# Patient Record
Sex: FEMALE | Race: WHITE | NOT HISPANIC OR LATINO | URBAN - METROPOLITAN AREA
[De-identification: names, ages, dates, MRNs, and addresses within clinical notes are randomized per-mention and may not be internally consistent; named-entity substitution may affect disease eponyms.]

---

## 2021-06-02 ENCOUNTER — EMERGENCY (EMERGENCY)
Facility: HOSPITAL | Age: 81
LOS: 0 days | Discharge: HOME | End: 2021-06-02
Attending: EMERGENCY MEDICINE
Payer: MEDICARE

## 2021-06-02 VITALS
HEART RATE: 75 BPM | TEMPERATURE: 98 F | SYSTOLIC BLOOD PRESSURE: 132 MMHG | DIASTOLIC BLOOD PRESSURE: 56 MMHG | OXYGEN SATURATION: 95 % | RESPIRATION RATE: 17 BRPM

## 2021-06-02 VITALS — WEIGHT: 139.99 LBS | HEIGHT: 64 IN

## 2021-06-02 DIAGNOSIS — E78.5 HYPERLIPIDEMIA, UNSPECIFIED: ICD-10-CM

## 2021-06-02 DIAGNOSIS — F41.9 ANXIETY DISORDER, UNSPECIFIED: ICD-10-CM

## 2021-06-02 DIAGNOSIS — Z96.651 PRESENCE OF RIGHT ARTIFICIAL KNEE JOINT: ICD-10-CM
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
## 2021-06-02 DIAGNOSIS — S80.01XA CONTUSION OF RIGHT KNEE, INITIAL ENCOUNTER: ICD-10-CM

## 2021-06-02 DIAGNOSIS — S00.91XA ABRASION OF UNSPECIFIED PART OF HEAD, INITIAL ENCOUNTER: ICD-10-CM

## 2021-06-02 DIAGNOSIS — Z23 ENCOUNTER FOR IMMUNIZATION: ICD-10-CM

## 2021-06-02 DIAGNOSIS — M79.10 MYALGIA, UNSPECIFIED SITE: ICD-10-CM

## 2021-06-02 DIAGNOSIS — Z88.5 ALLERGY STATUS TO NARCOTIC AGENT: ICD-10-CM

## 2021-06-02 DIAGNOSIS — S09.90XA UNSPECIFIED INJURY OF HEAD, INITIAL ENCOUNTER: ICD-10-CM
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
## 2021-06-02 PROCEDURE — 73562 X-RAY EXAM OF KNEE 3: CPT | Mod: RT

## 2021-06-02 PROCEDURE — 73562 X-RAY EXAM OF KNEE 3: CPT | Mod: 26,RT

## 2021-06-02 PROCEDURE — 99283 EMERGENCY DEPT VISIT LOW MDM: CPT | Mod: 25

## 2021-06-02 PROCEDURE — 90471 IMMUNIZATION ADMIN: CPT

## 2021-06-02 PROCEDURE — 99283 EMERGENCY DEPT VISIT LOW MDM: CPT

## 2021-06-02 PROCEDURE — 90715 TDAP VACCINE 7 YRS/> IM: CPT

## 2021-06-02 RX ORDER — TETANUS TOXOID, REDUCED DIPHTHERIA TOXOID AND ACELLULAR PERTUSSIS VACCINE, ADSORBED 5; 2.5; 8; 8; 2.5 [IU]/.5ML; [IU]/.5ML; UG/.5ML; UG/.5ML; UG/.5ML
0.5 SUSPENSION INTRAMUSCULAR ONCE
Refills: 0 | Status: COMPLETED | OUTPATIENT
Start: 2021-06-02 | End: 2021-06-02

## 2021-06-02 RX ADMIN — TETANUS TOXOID, REDUCED DIPHTHERIA TOXOID AND ACELLULAR PERTUSSIS VACCINE, ADSORBED 0.5 MILLILITER(S): 5; 2.5; 8; 8; 2.5 SUSPENSION INTRAMUSCULAR at 22:41

## 2021-06-02 NOTE — ED PROVIDER NOTE - OBJECTIVE STATEMENT
82 y/o female in ED s/p trip and fall this afternoon at 1300.   pt states hit head and knee on floor.   pt denies any LOC, change in vision, neck pain, cp, sob, n/v/d/abd pain.    pt states went to UC and advised to come to ED.   states instead went to her PMD and had CT done with no bleed.   pt states then received call from  that she needed to be evaluated at [facility].

## 2021-06-02 NOTE — ED ADULT NURSE NOTE - NSIMPLEMENTINTERV_GEN_ALL_ED
Implemented All Fall Risk Interventions:  [place] to call system. Call bell, personal items and telephone within reach. Instruct patient to call for assistance. Room bathroom lighting operational. Non-slip footwear when patient is off stretcher. Physically safe environment: no spills, clutter or unnecessary equipment. Stretcher in lowest position, wheels locked, appropriate side rails in place. Provide visual cue, wrist band, yellow gown, etc. Monitor gait and stability. Monitor for mental status changes and reorient to person, place, and time. Review medications for side effects contributing to fall risk. Reinforce activity limits and safety measures with patient and family.

## 2021-06-02 NOTE — ED PROVIDER NOTE - PATIENT PORTAL LINK FT
You can access the FollowMyHealth Patient Portal offered by [facility] by registering at the following website: [email or web address]. By joining [employer]’s FollowMyHealth portal, you will also be able to view your health information using other applications (apps) compatible with our system.

## 2021-06-02 NOTE — ED ADULT NURSE NOTE - OBJECTIVE STATEMENT
Pt. is a 81YOF presents to ED with complaints of head injury s/p fall at approximately 1300 today. pt was seen at urgent care and was referred to [facility], but went to [facility]. pt received CT and MRI, but on follow up call from urgent care was told to still report to [facility] for further work up. pt denies blood thinners, but does endorse seizure history. Pt. has bruising and abrasion to left side of forehead. C/o knee pain

## 2021-06-02 NOTE — ED ADULT NURSE NOTE - CHIEF COMPLAINT QUOTE
pt presents to ED with complaints of head injury s/p fall at approximately 1300 today. pt was seen at urgent care and was referred to [facility], but went to [facility]. pt received CT and MRI, but on follow up call from urgent care was told to still report to [facility] for further work up. pt denies blood thinners, but does endorse seizure history.  MD Ayala called to triage for possible neuro alert. no neuro alert to be called, but pt to be sent to main for further evaluation.

## 2021-06-02 NOTE — ED ADULT TRIAGE NOTE - CHIEF COMPLAINT QUOTE
pt presents to ED with complaints of head injury s/p fall at approximately 1300 today. pt was seen at urgent care and was referred to [facility], but went to [facility]. pt received CT and MRI, but on follow up call from urgent care was told to still report to [facility] for further work up. pt denies blood thinners, but does endorse seizure history.  MD Ayala called to triage for possible neuro alert. no neuro alert to be called, but pt to be sent to main for further evaluation.

## 2021-06-02 NOTE — ED PROVIDER NOTE - NSFOLLOWUPINSTRUCTIONS_ED_ALL_ED_FT
please follow up with your doctor in 3-5 days.   keep wounds clean and dry.   return to ED for any concerns

## 2021-06-15 ENCOUNTER — EMERGENCY (EMERGENCY)
Facility: HOSPITAL | Age: 81
LOS: 0 days | Discharge: HOME | End: 2021-06-15
Attending: EMERGENCY MEDICINE
Payer: MEDICARE

## 2021-06-15 VITALS — HEIGHT: 64 IN | WEIGHT: 130.07 LBS

## 2021-06-15 VITALS
DIASTOLIC BLOOD PRESSURE: 67 MMHG | HEART RATE: 86 BPM | OXYGEN SATURATION: 96 % | TEMPERATURE: 98 F | SYSTOLIC BLOOD PRESSURE: 120 MMHG | RESPIRATION RATE: 18 BRPM

## 2021-06-15 DIAGNOSIS — E78.5 HYPERLIPIDEMIA, UNSPECIFIED: ICD-10-CM

## 2021-06-15 DIAGNOSIS — Z96.651 PRESENCE OF RIGHT ARTIFICIAL KNEE JOINT: ICD-10-CM

## 2021-06-15 DIAGNOSIS — S90.01XA CONTUSION OF RIGHT ANKLE, INITIAL ENCOUNTER: ICD-10-CM

## 2021-06-15 DIAGNOSIS — G40.909 EPILEPSY, UNSPECIFIED, NOT INTRACTABLE, WITHOUT STATUS EPILEPTICUS: ICD-10-CM

## 2021-06-15 DIAGNOSIS — F41.9 ANXIETY DISORDER, UNSPECIFIED: ICD-10-CM

## 2021-06-15 DIAGNOSIS — M25.561 PAIN IN RIGHT KNEE: ICD-10-CM

## 2021-06-15 PROCEDURE — 99284 EMERGENCY DEPT VISIT MOD MDM: CPT

## 2021-06-15 PROCEDURE — 93971 EXTREMITY STUDY: CPT | Mod: RT

## 2021-06-15 PROCEDURE — 93971 EXTREMITY STUDY: CPT | Mod: 26,RT

## 2021-06-15 PROCEDURE — 99284 EMERGENCY DEPT VISIT MOD MDM: CPT | Mod: 25

## 2021-06-15 NOTE — ED ADULT TRIAGE NOTE - CHIEF COMPLAINT QUOTE
patient states she fell about 2 weeks ago landing on her right knee.  patient returns today for worsening bruising to area.  patient denies worsening pain.

## 2021-06-15 NOTE — ED STATDOCS - ATTENDING CONTRIBUTION TO CARE
Dr. Quinonez: I performed a face to face bedside interview with patient regarding history of present illness, review of symptoms and past medical history. I completed an independent physical exam.  I have discussed patient's plan of care with PA.   I agree with note as stated above, having amended the EMR as needed to reflect my findings.   This includes HISTORY OF PRESENT ILLNESS, HIV, PAST MEDICAL/SURGICAL/FAMILY/SOCIAL HISTORY, ALLERGIES AND HOME MEDICATIONS, REVIEW OF SYSTEMS, PHYSICAL EXAM, and any PROGRESS NOTES during the time I functioned as the attending physician for this patient.

## 2021-06-15 NOTE — ED STATDOCS - OBJECTIVE STATEMENT
80 y/o F with PMHx of HLD, seizure disorder, anxiety, and s/p R TKR presents to the ED c/o increased +ecchymosis to R ankle s/p fall 2 weeks ago. Pt already had negative XR, now concerned for possible clot. No fever. PCP: Dr. Mattie Horton.

## 2021-06-15 NOTE — ED STATDOCS - SKIN, MLM
+well healed R midline knee scar with healing superficial laceration and purple blue ecchymosis from knee to foot

## 2021-06-15 NOTE — ED STATDOCS - PROGRESS NOTE DETAILS
Patient pending doppler to r/o DVT, case endorsed to CHRISTIAN Alves PA-C Neg. DVT.   Will AURELIA Gonazlez PA-C

## 2021-06-15 NOTE — ED STATDOCS - PATIENT PORTAL LINK FT
You can access the FollowMyHealth Patient Portal offered by  by registering at the following website: [email or web address]. By joining [employer]’s FollowMyHealth portal, you will also be able to view your health information using other applications (apps) compatible with our system.

## 2021-06-15 NOTE — ED STATDOCS - NSFOLLOWUPINSTRUCTIONS_ED_ALL_ED_FT
Knee Pain    AMBULATORY CARE:    Knee pain may start suddenly, or it may be a long-term problem. You may have pain on the side, front, or back of your knee. You may have knee stiffness and swelling. You may hear popping sounds or feel like your knee is giving way or locking up as you walk. You may feel pain when you sit, stand, walk, or climb up and down stairs. Knee pain can be caused by conditions such as obesity, inflammation, or strains or tears in ligaments or tendons.    Seek care immediately if:   •Your pain is worse, even after treatment.       •You cannot bend or straighten your leg completely.       •The swelling around your knee does not go down even with treatment.      •Your knee is painful and hot to the touch.       Contact your healthcare provider if:   •You have questions or concerns about your condition or care.           Treatment will depend on the cause of your pain. You may need any of the following:   •NSAIDs help decrease swelling and pain or fever. This medicine is available with or without a doctor's order. NSAIDs can cause stomach bleeding or kidney problems in certain people. If you take blood thinner medicine, always ask your healthcare provider if NSAIDs are safe for you. Always read the medicine label and follow directions.      •Acetaminophen decreases pain and fever. It is available without a doctor's order. Ask how much to take and how often to take it. Follow directions. Read the labels of all other medicines you are using to see if they also contain acetaminophen, or ask your doctor or pharmacist. Acetaminophen can cause liver damage if not taken correctly. Do not use more than 4 grams (4,000 milligrams) total of acetaminophen in one day.       •Prescription pain medicine may be given. Ask your healthcare provider how to take this medicine safely. Some prescription pain medicines contain acetaminophen. Do not take other medicines that contain acetaminophen without talking to your healthcare provider. Too much acetaminophen may cause liver damage. Prescription pain medicine may cause constipation. Ask your healthcare provider how to prevent or treat constipation.       •Steroid injections may be given into your knee. Steroids reduce inflammation and pain.      •Surgery may be used for some injuries, such as to repair a torn ACL.      What you can do to manage your symptoms:   •Rest your knee so it can heal. Limit activities that increase your pain. Do low-impact exercises, such as walking or swimming.       •Apply ice to help reduce swelling and pain. Use an ice pack, or put crushed ice in a plastic bag. Cover it with a towel before you apply it to your knee. Apply ice for 15 to 20 minutes every hour, or as directed.      •Apply compression to help reduce swelling. Use a brace or bandage only as directed.      •Elevate your knee to help decrease pain and swelling. Elevate your knee while you are sitting or lying down. Prop your leg on pillows to keep your knee above the level of your heart.      •Prevent your knee from moving as directed. Your healthcare provider may put on a cast or splint. You may need to wear a leg brace to stabilize your knee. A leg brace can be adjusted to increase your range of motion as your knee heals.  Hinged Knee Braces            What you can do to prevent knee pain:   •Maintain a healthy weight. Extra weight increases your risk for knee pain. Ask your healthcare provider how much you should weigh. He or she can help you create a safe weight loss plan if you need to lose weight.      •Exercise or train properly. Use the correct equipment for sports. Wear shoes that provide good support. Check your posture often as you exercise, play sports, or train for an event. This can help prevent stress and strain on your knees. Rest between sessions so you do not overwork your knees.      Follow up with your healthcare provider within 24 hours or as directed: Write down your questions so you remember to ask them during your visits.

## 2021-06-15 NOTE — ED STATDOCS - CARE PROVIDER_API CALL
Eldon Cabrera)  Orthopaedic Surgery  [address] [place] [address] [place] [address]  Phone: [phone]  Fax: [phone]  Follow Up Time:

## 2021-09-20 NOTE — ED STATDOCS - CPE ED MUSC NORM
Clinic Care Coordination Contact  [facility]: Post-Discharge Note  SITUATION                                                      Admission:    Admission Date: 09/18/21   Reason for Admission: Diarrhea  Discharge:   Discharge Date: 09/18/21  Discharge Diagnosis: Colitis , Rectal bleeding    BACKGROUND                                                         Rowan Leiva is a 61 year old female on Eliquis with recent history of pancolitis, DVT, cerebellar stroke, anemia, NSTEMI, CKD, and type 2 diabetes who presents for evaluation of diarrhea. Rowan was admitted to [facility] on 9/14 for pancolitis after one week of diarrhea with blood in the stool. She was taken off Eliquis. She left [place] on 9/16 because she did not feel there was a plan for her care and the bleeding had stopped. Since then she continues to have diarrhea and developed blood in the stool again last night. She has had 30 episode of diarrhea yesterday with about 7 episodes that were completely blood. She reports associated 8/10 abdominal pain just before a bowel movement.    ASSESSMENT      Enrollment  Primary Care Care Coordination Status: Declined    Discharge Assessment  How are you doing now that you are home?: Patient states that all diarrhea and rectal bleeding resolved yesterday 9/19/21 morning. Denies any compliants today. Reviewed medication list, AVS and recommendation to follow up with PCP and GI. Patient is not sure who to call to establish care with GI. Advised patient to follow up with PCP and they can make GI referral as they see fit. Patient verbalized understanding. Patient declines CC follow up outreaches as she has all the support and resources she needs at this time.  How are your symptoms? (Red Flag symptoms escalate to triage hotline per guidelines): Improved  Do you feel your condition is stable enough to be safe at home until your provider visit?: Yes  Does the patient have their discharge instructions? : Yes  Does  the patient have questions regarding their discharge instructions? : No  Were you started on any new medications or were there changes to any of your previous medications? : No  Does the patient have all of their medications?: Yes  Do you have questions regarding any of your medications? : Yes (see comment) (Patient is wondering if/when she should restart blood thinner med, advised to see PCP)  Do you have all of your needed medical supplies or equipment (DME)?  (i.e. oxygen tank, CPAP, cane, etc.): Yes  Discharge follow-up appointment scheduled within 14 calendar days? : No  Is patient agreeable to assistance with scheduling? : Yes              Care Management       Care Mgmt General Assessment  Referral  Referral Source: IP Handoff  Health Care Home/Utilization  Preferred Hospital: [facility]  [phone]  Living Situation  Current living arrangement:: Not Assessed  Type of residence:: Private home - stairs  Resources  Patient receiving home care services:: No  Community Resources: DME  Supplies used at home:: Diabetic Supplies  Equipment Currently Used at Home: walker, standard  Referrals Placed: None  Psychosocial  Pentecostal or spiritual beliefs that impact treatment:: No  Mental health DX:: No  Mental health management concern (GOAL):: No  Informal Support system:: Partner;Parent  Functional Status  Dependent ADLs:: Independent  Dependent IADLs:: Independent  Bed or wheelchair confined:: No  Mobility Status: Independent  Advance Care Plan/Directive  Advanced Care Plans/Directives on file:: No  Advanced Care Plan/Directive Status: Considering Options    PLAN                                                      Outpatient Plan:  Patient was provided with RN CC's contact information and encouraged to call with questions, concerns, support needs. RN CC will remain available as needed. No further care coordination outreaches will be made at this time.       Future Appointments   Date Time  Provider Department Center   9/21/2021  9:00 AM Emmie Bar, [facility]   10/5/2021  2:00 PM CS PULMONARY FUNCTION [facility]    10/5/2021  3:00 PM Karen Champagne MD [facility] CS   10/11/2021  1:30 PM  PIV LAB [facility]   10/11/2021  2:20 PM Lexa Miller MD [facility]         For any urgent concerns, please contact our 24 hour nurse triage line: [phone] ([phone])         Zakia Green RN                 normal...

## 2022-05-28 ENCOUNTER — INPATIENT (INPATIENT)
Facility: HOSPITAL | Age: 82
LOS: 5 days | Discharge: NURSING, REHAB OR LONG-TERM CARE | DRG: 101 | End: 2022-06-03
Attending: STUDENT IN AN ORGANIZED HEALTH CARE EDUCATION/TRAINING PROGRAM | Admitting: INTERNAL MEDICINE
Payer: MEDICARE

## 2022-05-28 VITALS
DIASTOLIC BLOOD PRESSURE: 72 MMHG | TEMPERATURE: 98 F | RESPIRATION RATE: 18 BRPM | SYSTOLIC BLOOD PRESSURE: 143 MMHG | HEART RATE: 74 BPM | HEIGHT: 64 IN | OXYGEN SATURATION: 96 % | WEIGHT: 156.97 LBS

## 2022-05-28 LAB
ALBUMIN SERPL ELPH-MCNC: 3.3 G/DL — SIGNIFICANT CHANGE UP (ref 3.3–5)
ALP SERPL-CCNC: 64 U/L — SIGNIFICANT CHANGE UP (ref 40–120)
ALT FLD-CCNC: 27 U/L — SIGNIFICANT CHANGE UP (ref 12–78)
ANION GAP SERPL CALC-SCNC: 3 MMOL/L — LOW (ref 5–17)
APTT BLD: 29.8 SEC — SIGNIFICANT CHANGE UP (ref 27.5–35.5)
AST SERPL-CCNC: 28 U/L — SIGNIFICANT CHANGE UP (ref 15–37)
BASOPHILS # BLD AUTO: 0.03 K/UL — SIGNIFICANT CHANGE UP (ref 0–0.2)
BASOPHILS NFR BLD AUTO: 0.5 % — SIGNIFICANT CHANGE UP (ref 0–2)
BILIRUB SERPL-MCNC: 0.4 MG/DL — SIGNIFICANT CHANGE UP (ref 0.2–1.2)
BUN SERPL-MCNC: 17 MG/DL — SIGNIFICANT CHANGE UP (ref 7–23)
CALCIUM SERPL-MCNC: 8.9 MG/DL — SIGNIFICANT CHANGE UP (ref 8.5–10.1)
CHLORIDE SERPL-SCNC: 106 MMOL/L — SIGNIFICANT CHANGE UP (ref 96–108)
CO2 SERPL-SCNC: 30 MMOL/L — SIGNIFICANT CHANGE UP (ref 22–31)
CREAT SERPL-MCNC: 0.83 MG/DL — SIGNIFICANT CHANGE UP (ref 0.5–1.3)
EGFR: 71 ML/MIN/1.73M2 — SIGNIFICANT CHANGE UP
EOSINOPHIL # BLD AUTO: 0.15 K/UL — SIGNIFICANT CHANGE UP (ref 0–0.5)
EOSINOPHIL NFR BLD AUTO: 2.6 % — SIGNIFICANT CHANGE UP (ref 0–6)
ETHANOL SERPL-MCNC: <10 MG/DL — SIGNIFICANT CHANGE UP (ref 0–10)
GLUCOSE SERPL-MCNC: 100 MG/DL — HIGH (ref 70–99)
HCT VFR BLD CALC: 38.8 % — SIGNIFICANT CHANGE UP (ref 34.5–45)
HGB BLD-MCNC: 13.1 G/DL — SIGNIFICANT CHANGE UP (ref 11.5–15.5)
IMM GRANULOCYTES NFR BLD AUTO: 0.3 % — SIGNIFICANT CHANGE UP (ref 0–1.5)
INR BLD: 1.16 RATIO — SIGNIFICANT CHANGE UP (ref 0.88–1.16)
LYMPHOCYTES # BLD AUTO: 1.31 K/UL — SIGNIFICANT CHANGE UP (ref 1–3.3)
LYMPHOCYTES # BLD AUTO: 22.5 % — SIGNIFICANT CHANGE UP (ref 13–44)
MCHC RBC-ENTMCNC: 31.9 PG — SIGNIFICANT CHANGE UP (ref 27–34)
MCHC RBC-ENTMCNC: 33.8 GM/DL — SIGNIFICANT CHANGE UP (ref 32–36)
MCV RBC AUTO: 94.4 FL — SIGNIFICANT CHANGE UP (ref 80–100)
MONOCYTES # BLD AUTO: 0.64 K/UL — SIGNIFICANT CHANGE UP (ref 0–0.9)
MONOCYTES NFR BLD AUTO: 11 % — SIGNIFICANT CHANGE UP (ref 2–14)
NEUTROPHILS # BLD AUTO: 3.68 K/UL — SIGNIFICANT CHANGE UP (ref 1.8–7.4)
NEUTROPHILS NFR BLD AUTO: 63.1 % — SIGNIFICANT CHANGE UP (ref 43–77)
PLATELET # BLD AUTO: 212 K/UL — SIGNIFICANT CHANGE UP (ref 150–400)
POTASSIUM SERPL-MCNC: 4.3 MMOL/L — SIGNIFICANT CHANGE UP (ref 3.5–5.3)
POTASSIUM SERPL-SCNC: 4.3 MMOL/L — SIGNIFICANT CHANGE UP (ref 3.5–5.3)
PROT SERPL-MCNC: 6.6 GM/DL — SIGNIFICANT CHANGE UP (ref 6–8.3)
PROTHROM AB SERPL-ACNC: 13.5 SEC — HIGH (ref 10.5–13.4)
RBC # BLD: 4.11 M/UL — SIGNIFICANT CHANGE UP (ref 3.8–5.2)
RBC # FLD: 12.7 % — SIGNIFICANT CHANGE UP (ref 10.3–14.5)
SODIUM SERPL-SCNC: 139 MMOL/L — SIGNIFICANT CHANGE UP (ref 135–145)
TROPONIN I, HIGH SENSITIVITY RESULT: 17.59 NG/L — SIGNIFICANT CHANGE UP
WBC # BLD: 5.83 K/UL — SIGNIFICANT CHANGE UP (ref 3.8–10.5)
WBC # FLD AUTO: 5.83 K/UL — SIGNIFICANT CHANGE UP (ref 3.8–10.5)

## 2022-05-28 PROCEDURE — 36415 COLL VENOUS BLD VENIPUNCTURE: CPT

## 2022-05-28 PROCEDURE — 80185 ASSAY OF PHENYTOIN TOTAL: CPT

## 2022-05-28 PROCEDURE — 95816 EEG AWAKE AND DROWSY: CPT

## 2022-05-28 PROCEDURE — 85027 COMPLETE CBC AUTOMATED: CPT

## 2022-05-28 PROCEDURE — 97116 GAIT TRAINING THERAPY: CPT | Mod: GP

## 2022-05-28 PROCEDURE — U0005: CPT

## 2022-05-28 PROCEDURE — A9579: CPT

## 2022-05-28 PROCEDURE — 70553 MRI BRAIN STEM W/O & W/DYE: CPT

## 2022-05-28 PROCEDURE — 92523 SPEECH SOUND LANG COMPREHEN: CPT | Mod: GN

## 2022-05-28 PROCEDURE — 92507 TX SP LANG VOICE COMM INDIV: CPT | Mod: GN

## 2022-05-28 PROCEDURE — 70450 CT HEAD/BRAIN W/O DYE: CPT | Mod: 26,MA

## 2022-05-28 PROCEDURE — 95700 EEG CONT REC W/VID EEG TECH: CPT

## 2022-05-28 PROCEDURE — 80177 DRUG SCRN QUAN LEVETIRACETAM: CPT

## 2022-05-28 PROCEDURE — 80053 COMPREHEN METABOLIC PANEL: CPT

## 2022-05-28 PROCEDURE — 93010 ELECTROCARDIOGRAM REPORT: CPT

## 2022-05-28 PROCEDURE — 99285 EMERGENCY DEPT VISIT HI MDM: CPT

## 2022-05-28 PROCEDURE — U0003: CPT

## 2022-05-28 PROCEDURE — 92526 ORAL FUNCTION THERAPY: CPT | Mod: GN

## 2022-05-28 PROCEDURE — 95714 VEEG EA 12-26 HR UNMNTR: CPT

## 2022-05-28 PROCEDURE — 71045 X-RAY EXAM CHEST 1 VIEW: CPT | Mod: 26

## 2022-05-28 PROCEDURE — 97162 PT EVAL MOD COMPLEX 30 MIN: CPT | Mod: GP

## 2022-05-28 PROCEDURE — 97530 THERAPEUTIC ACTIVITIES: CPT | Mod: GP

## 2022-05-28 PROCEDURE — C9254: CPT

## 2022-05-28 PROCEDURE — 92610 EVALUATE SWALLOWING FUNCTION: CPT | Mod: GN

## 2022-05-28 RX ORDER — LEVETIRACETAM 250 MG/1
1000 TABLET, FILM COATED ORAL ONCE
Refills: 0 | Status: COMPLETED | OUTPATIENT
Start: 2022-05-28 | End: 2022-05-28

## 2022-05-28 RX ORDER — ASPIRIN/CALCIUM CARB/MAGNESIUM 324 MG
300 TABLET ORAL ONCE
Refills: 0 | Status: COMPLETED | OUTPATIENT
Start: 2022-05-28 | End: 2022-05-28

## 2022-05-28 RX ORDER — SODIUM CHLORIDE 9 MG/ML
1000 INJECTION INTRAMUSCULAR; INTRAVENOUS; SUBCUTANEOUS
Refills: 0 | Status: DISCONTINUED | OUTPATIENT
Start: 2022-05-28 | End: 2022-06-01

## 2022-05-28 RX ADMIN — Medication 300 MILLIGRAM(S): at 23:09

## 2022-05-28 RX ADMIN — Medication 0.5 MILLIGRAM(S): at 23:10

## 2022-05-28 RX ADMIN — LEVETIRACETAM 400 MILLIGRAM(S): 250 TABLET, FILM COATED ORAL at 23:09

## 2022-05-28 NOTE — ED ADULT NURSE NOTE - NSFALLRSKHMRSKTYOTFT_ED_ALL_ED
October 5, 2020        Yasmany Acosta  [address] [facility] [address]    To Whom It May Concern:    This is to certify Yasmany Acosta was evaluated on 10/05/20 and is unable to return to work.    Yasmany Acosta should self-quarantine until at least 10/6/2020.  ?  CDC guidelines for return to work are as follows:  • At least 24 hours have passed since fever resolution without use of fever reducing medication and   • Symptoms have improved and  • At least 10 days have passed since symptoms first appeared     Many employers are asking that employees be seen and reexamined to return to work. We ask that you follow the CDC guidelines above and that you do not ask that your employees be seen back in the clinic setting for a Return to Work slip when off due to presumed Covid related symptoms.    The Coronavirus is a rapidly evolving situation and recommendations are changing regularly to prevent spread of the disease and further loss of life.    Thank you for your understanding during these unusual times.          Mike Short MD                 [address] [facility] [address]     code stroke

## 2022-05-28 NOTE — ED PROVIDER NOTE - NEUROLOGICAL, MLM
expressive aphasia, no other neurological issues expressive aphasia, 5/5 strength bilateral upper and lower extremities, no sensory deficits, no facial droop, no dysmetria, normal vision, NIHSS 1 for aphasia

## 2022-05-28 NOTE — ED ADULT NURSE NOTE - CAS DISCH BELONGINGS RETURNED
01/11/22 0650   Sleep Analysis   Sleep Report Good   Sleep/Wake Cycle Unremarkable   Hours Slept 8   What Shift Do You Work? Does not apply   Have you been diagnosed with Sleep Apnea? No      Not applicable

## 2022-05-28 NOTE — ED PROVIDER NOTE - PROGRESS NOTE DETAILS
Sammie Alvarado for attending Dr. Dickinson: spoke with souse again pt last known normal was 9pm last night woke up at 7 am with symptoms. Sammie Alvarado for attending Dr. Dickinson: discussed with Dr. Rendon states pt can receive additional ativat, IV, keppra and can get EEG in the morning. s/p ativan, keppra, speech much more fluid, but she is sleepy 2/2 ativan.  Discussed with Dr. Burton for admit. Olegario Dickinson D.O.

## 2022-05-28 NOTE — ED ADULT TRIAGE NOTE - CHIEF COMPLAINT QUOTE
pt presents to the ED with AMS. as per  pt has been altered from baseline since 1900. pt with hx of seizures. Dr. Dickinson evaluated pt at triage, code stroke called at 2126.

## 2022-05-28 NOTE — ED ADULT NURSE NOTE - OBJECTIVE STATEMENT
Pt brought to ED by ambulance with complaints of altered mental status. Pt's  at bedside. 3 Pt brought to ED by ambulance with complaints of altered mental status. Code stroke called by MD Dickinson in EMS triage. Pt's  and daughter at bedside. As per Pt's , last known well was at 2100 yesterday, 5/27/22, before Pt went to bed. Pt's  states that Pt woke up altered this morning. Pt's  administered 1000 mg kepra and 1mg ativan as per Pt's normal routine. Pt slept for a few hours and awoke still altered. Pt's  administered evening dose of 1000 mg kepra as well. Per Pt's , Pt has history of seizures but has never had a stroke. Pt is ambulatory at  home. A&O x1 at this time; per Pt's family, Pt is normally alert and oriented. Pt does not follow directions or appropriately responds to certain questioning at this time. Airway is patent, breathing is even and unlabored. No evidence of shortness of breath or difficult breathing. Skin is warm and dry. PMS x4 extremities. Non-verbal indicators of pain absent. As per Pt's , Pt has history of right sided neck pain that has been ongoing for past 15 months without relief, Pt was scheduled to see neurologist on Tuesday, 5/31/22. 20G IV placed to right AC by EMS triage, flushes without difficulty. 22G IV placed by RN to left wrist, flushes without difficulty. Blood sent to lab for analysis. Pt placed on cardiac monitor, EKG completed. No additional requests or complaints at this time. Will continue to monitor.

## 2022-05-28 NOTE — PROVIDER CONTACT NOTE (OTHER) - SITUATION
paged @[phone] Dr. Dickinson spoke to Dr. Delarosa (oncall neurology)  paged @[phone], returned page @[phone]

## 2022-05-28 NOTE — ED PROVIDER NOTE - OBJECTIVE STATEMENT
81 y/o female with a PMHx of seizures on keppra, anxiety on ativan, and HLD presents to the ED BIB EMS s/p change in speech.  at bedside states pt began having difficulty speaking at 7 am, but states it was similar to prior seizures. Pt took an ativan, symptoms improved and she went to sleep. However, after waking up at 3 pm her symptoms were reportedly worse.  also states pt has had chronic neck pain s/p mechanical fall last year. pt has been scanned for seizures with no remarkable findings.  reports that pt does not shake during seizures and usually presents has changes in speech. Neuro: Dr. Pendleton. Stroke alert activated. 81 y/o female with a PMHx of seizures on keppra, anxiety on ativan, and HLD presents to the ED BIB EMS s/p change in speech.  at bedside states pt began having difficulty speaking at 7 am, but states it was similar to prior seizures.  gave patient 1 mg ativan PO, and patient slept after this.  However, after waking up at 3-330 pm her symptoms were still present, and according to  reportedly worse. He gave her other seizure medications today as well, Keppra 1000 mg AM and PM doses, without improvement in her symptoms.   also states pt has had chronic neck pain s/p mechanical fall last year. pt has been scanned for seizures with no remarkable findings.  reports that pt does not shake during seizures and usually presents has changes in speech. Neuro: Dr. Pendleton. Stroke alert activated.

## 2022-05-28 NOTE — ED ADULT NURSE NOTE - NSIMPLEMENTINTERV_GEN_ALL_ED
Implemented All Fall with Harm Risk Interventions:  [place] to call system. Call bell, personal items and telephone within reach. Instruct patient to call for assistance. Room bathroom lighting operational. Non-slip footwear when patient is off stretcher. Physically safe environment: no spills, clutter or unnecessary equipment. Stretcher in lowest position, wheels locked, appropriate side rails in place. Provide visual cue, wrist band, yellow gown, etc. Monitor gait and stability. Monitor for mental status changes and reorient to person, place, and time. Review medications for side effects contributing to fall risk. Reinforce activity limits and safety measures with patient and family. Provide visual clues: red socks.

## 2022-05-28 NOTE — ED PROVIDER NOTE - CLINICAL SUMMARY MEDICAL DECISION MAKING FREE TEXT BOX
Patient presented > 24 hours from last known normal, not tPA or interventional candidate.  Code stroke called to rule out bleed, which was negative.  Labs WNL.  Discussed with Dr. Delarosa, patient received additional dose of keppra and ativan, with apparent improvement in fluidity of speech.  Given ASA to cover for possible TIA/CVA.  More likely seizure given history of similar presentation in the past.  Plan for admit, neurology consult in AM, EEG in AM, MRI.  Olegario Dickinson D.O.

## 2022-05-29 DIAGNOSIS — R47.01 APHASIA: ICD-10-CM

## 2022-05-29 LAB
ALBUMIN SERPL ELPH-MCNC: 3.6 G/DL — SIGNIFICANT CHANGE UP (ref 3.3–5)
ALP SERPL-CCNC: 62 U/L — SIGNIFICANT CHANGE UP (ref 40–120)
ALT FLD-CCNC: 24 U/L — SIGNIFICANT CHANGE UP (ref 12–78)
ANION GAP SERPL CALC-SCNC: 10 MMOL/L — SIGNIFICANT CHANGE UP (ref 5–17)
APPEARANCE UR: CLEAR — SIGNIFICANT CHANGE UP
AST SERPL-CCNC: 23 U/L — SIGNIFICANT CHANGE UP (ref 15–37)
BILIRUB SERPL-MCNC: 0.4 MG/DL — SIGNIFICANT CHANGE UP (ref 0.2–1.2)
BILIRUB UR-MCNC: NEGATIVE — SIGNIFICANT CHANGE UP
BUN SERPL-MCNC: 17 MG/DL — SIGNIFICANT CHANGE UP (ref 7–23)
CALCIUM SERPL-MCNC: 9.2 MG/DL — SIGNIFICANT CHANGE UP (ref 8.5–10.1)
CHLORIDE SERPL-SCNC: 107 MMOL/L — SIGNIFICANT CHANGE UP (ref 96–108)
CO2 SERPL-SCNC: 22 MMOL/L — SIGNIFICANT CHANGE UP (ref 22–31)
COLOR SPEC: YELLOW — SIGNIFICANT CHANGE UP
CREAT SERPL-MCNC: 0.89 MG/DL — SIGNIFICANT CHANGE UP (ref 0.5–1.3)
DIFF PNL FLD: NEGATIVE — SIGNIFICANT CHANGE UP
EGFR: 66 ML/MIN/1.73M2 — SIGNIFICANT CHANGE UP
GLUCOSE SERPL-MCNC: 112 MG/DL — HIGH (ref 70–99)
GLUCOSE UR QL: NEGATIVE — SIGNIFICANT CHANGE UP
HCT VFR BLD CALC: 40 % — SIGNIFICANT CHANGE UP (ref 34.5–45)
HGB BLD-MCNC: 13.6 G/DL — SIGNIFICANT CHANGE UP (ref 11.5–15.5)
KETONES UR-MCNC: NEGATIVE — SIGNIFICANT CHANGE UP
LEUKOCYTE ESTERASE UR-ACNC: ABNORMAL
MCHC RBC-ENTMCNC: 32.1 PG — SIGNIFICANT CHANGE UP (ref 27–34)
MCHC RBC-ENTMCNC: 34 GM/DL — SIGNIFICANT CHANGE UP (ref 32–36)
MCV RBC AUTO: 94.3 FL — SIGNIFICANT CHANGE UP (ref 80–100)
NITRITE UR-MCNC: NEGATIVE — SIGNIFICANT CHANGE UP
PH UR: 7 — SIGNIFICANT CHANGE UP (ref 5–8)
PLATELET # BLD AUTO: 192 K/UL — SIGNIFICANT CHANGE UP (ref 150–400)
POTASSIUM SERPL-MCNC: 3.6 MMOL/L — SIGNIFICANT CHANGE UP (ref 3.5–5.3)
POTASSIUM SERPL-SCNC: 3.6 MMOL/L — SIGNIFICANT CHANGE UP (ref 3.5–5.3)
PROT SERPL-MCNC: 6.8 GM/DL — SIGNIFICANT CHANGE UP (ref 6–8.3)
PROT UR-MCNC: NEGATIVE — SIGNIFICANT CHANGE UP
RBC # BLD: 4.24 M/UL — SIGNIFICANT CHANGE UP (ref 3.8–5.2)
RBC # FLD: 12.6 % — SIGNIFICANT CHANGE UP (ref 10.3–14.5)
SARS-COV-2 RNA SPEC QL NAA+PROBE: SIGNIFICANT CHANGE UP
SODIUM SERPL-SCNC: 139 MMOL/L — SIGNIFICANT CHANGE UP (ref 135–145)
SP GR SPEC: 1.01 — SIGNIFICANT CHANGE UP (ref 1.01–1.02)
UROBILINOGEN FLD QL: NEGATIVE — SIGNIFICANT CHANGE UP
WBC # BLD: 6.21 K/UL — SIGNIFICANT CHANGE UP (ref 3.8–10.5)
WBC # FLD AUTO: 6.21 K/UL — SIGNIFICANT CHANGE UP (ref 3.8–10.5)

## 2022-05-29 PROCEDURE — 99222 1ST HOSP IP/OBS MODERATE 55: CPT

## 2022-05-29 PROCEDURE — 95816 EEG AWAKE AND DROWSY: CPT | Mod: 26

## 2022-05-29 PROCEDURE — 99223 1ST HOSP IP/OBS HIGH 75: CPT

## 2022-05-29 PROCEDURE — 12345: CPT | Mod: NC

## 2022-05-29 RX ORDER — LEVETIRACETAM 250 MG/1
1000 TABLET, FILM COATED ORAL EVERY 12 HOURS
Refills: 0 | Status: DISCONTINUED | OUTPATIENT
Start: 2022-05-29 | End: 2022-05-29

## 2022-05-29 RX ORDER — LEVETIRACETAM 250 MG/1
1500 TABLET, FILM COATED ORAL EVERY 12 HOURS
Refills: 0 | Status: ACTIVE | OUTPATIENT
Start: 2022-05-29 | End: 2023-04-27

## 2022-05-29 RX ORDER — ACETAMINOPHEN 500 MG
650 TABLET ORAL EVERY 6 HOURS
Refills: 0 | Status: DISCONTINUED | OUTPATIENT
Start: 2022-05-29 | End: 2022-06-03

## 2022-05-29 RX ORDER — LACOSAMIDE 50 MG/1
100 TABLET ORAL EVERY 12 HOURS
Refills: 0 | Status: DISCONTINUED | OUTPATIENT
Start: 2022-05-29 | End: 2022-05-30

## 2022-05-29 RX ORDER — LANOLIN ALCOHOL/MO/W.PET/CERES
3 CREAM (GRAM) TOPICAL AT BEDTIME
Refills: 0 | Status: DISCONTINUED | OUTPATIENT
Start: 2022-05-29 | End: 2022-06-03

## 2022-05-29 RX ORDER — ONDANSETRON 8 MG/1
4 TABLET, FILM COATED ORAL EVERY 8 HOURS
Refills: 0 | Status: DISCONTINUED | OUTPATIENT
Start: 2022-05-29 | End: 2022-06-03

## 2022-05-29 RX ORDER — LACOSAMIDE 50 MG/1
200 TABLET ORAL ONCE
Refills: 0 | Status: DISCONTINUED | OUTPATIENT
Start: 2022-05-29 | End: 2022-05-29

## 2022-05-29 RX ORDER — LEVETIRACETAM 250 MG/1
1000 TABLET, FILM COATED ORAL
Refills: 0 | Status: DISCONTINUED | OUTPATIENT
Start: 2022-05-29 | End: 2022-05-29

## 2022-05-29 RX ADMIN — LEVETIRACETAM 400 MILLIGRAM(S): 250 TABLET, FILM COATED ORAL at 22:19

## 2022-05-29 RX ADMIN — Medication 250 MILLIGRAM(S): at 22:18

## 2022-05-29 RX ADMIN — LACOSAMIDE 120 MILLIGRAM(S): 50 TABLET ORAL at 22:59

## 2022-05-29 RX ADMIN — LACOSAMIDE 200 MILLIGRAM(S): 50 TABLET ORAL at 11:11

## 2022-05-29 RX ADMIN — Medication 0.5 MILLIGRAM(S): at 11:09

## 2022-05-29 RX ADMIN — LEVETIRACETAM 400 MILLIGRAM(S): 250 TABLET, FILM COATED ORAL at 11:11

## 2022-05-29 RX ADMIN — SODIUM CHLORIDE 100 MILLILITER(S): 9 INJECTION INTRAMUSCULAR; INTRAVENOUS; SUBCUTANEOUS at 01:36

## 2022-05-29 NOTE — SWALLOW BEDSIDE ASSESSMENT ADULT - SWALLOW EVAL: DIAGNOSIS
1) Pt exhibits reduced orientation to feeding(i.e inattention, distractibility) atop Oropharyngeal Swallowing abilities which subjectively appeared to be grossly functional for age when she is in an alert/calm state. NO behavioral aspiration signs exhibited. Odynophagia denied.  2) The pt is awake. She is inattentive and internally distractible. Unable to direct to structured communication tasks. Pt followed a limited # of 1 step verbal commands & occasionally verbalized during communicative probes. At these times, her motor speech abilities were functional. 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 Above in setting of suspected seizure activity.

## 2022-05-29 NOTE — H&P ADULT - ASSESSMENT
A/P:    1.  Aphasia  Seizure Disorder  -follow clinically with Neuro checks  -keep on Seizure precaution  -on Keppra  -follow Neurology consult  -fall precaution    2.  SCD for DVT ppx     3.  Code status: Patient is in full code status.

## 2022-05-29 NOTE — SWALLOW BEDSIDE ASSESSMENT ADULT - COMMENTS
Pt admitted to  with onset of seizure activity with concomitant altered mentation/verbal expression difficulties. The patient does have an underlying h/o a seizure disorder with seizure activity manifesting in Aphasia. Other selected prior medical history is remarkable for anxiety, HLD, and prior right TKR. The patient was admitted to  with onset of seizure activity with concomitant altered mentation/verbal expression difficulties. The patient does have an underlying h/o a seizure disorder with seizure activity manifesting in Aphasia. Other selected prior medical history is remarkable for anxiety, HLD, and prior right TKR.

## 2022-05-29 NOTE — H&P ADULT - NSHPPHYSICALEXAM_GEN_ALL_CORE
Vital Signs Last 24 Hrs  T(C): 36.4 (29 May 2022 03:30), Max: 37.1 (29 May 2022 00:41)  T(F): 97.5 (29 May 2022 03:30), Max: 98.7 (29 May 2022 00:41)  HR: 64 (29 May 2022 03:30) (64 - 80)  BP: 155/83 (29 May 2022 03:30) (121/57 - 155/83)  BP(mean): 105 (29 May 2022 03:30) (77 - 124)  RR: 18 (29 May 2022 03:30) (15 - 22)  SpO2: 99% (29 May 2022 03:30) (92% - 99%)

## 2022-05-29 NOTE — CONSULT NOTE ADULT - ASSESSMENT
82 year old woman hx of seizures, on Keppra 1000 mg in AM and 1500 mg in evening; presents with change in  mental state, similar events in the past usually brake with Ativan. Presently  exam limited due to mental state, grossly non focal, Ct head shows no acute changes. EEG c/w status epilepticus, non convulsive.  Suggests:  -change to IV Keppra, 1500 mg q 12  -add Vimpat 200 mg IV x 1, then 100 mg q 12  -Ativan 1 mg x 1 IVP  -EEG monitor

## 2022-05-29 NOTE — CONSULT NOTE ADULT - SUBJECTIVE AND OBJECTIVE BOX
Neurology Consult requested by:   Patient is a 82y old  Female who presents with a chief complaint of Aphasia and Seizure (29 May 2022 04:01)     HPI:  81 y/o woman with a PMHx of seizures presented to the ED BIB EMS after change in speech.  stated patient began having difficulty speaking yesterday at 7 am, but stated it was similar to prior seizures.  gave patient 1 mg ativan PO, and patient slept after this. However, after waking up at 3-330 pm yesterday her symptoms were still present, and according to  reportedly worse. He gave her other seizure medications today as well, Keppra 1000 mg AM and PM doses, without improvement in her symptoms.   also stated patient had chronic shoulder pain s/p mechanical fall last year.  reported that pt does not shake during seizures and usually presents with changes in speech.     Family Hx:  Unable to get detail family history as patient is aphasic and confused.  (29 May 2022 04:01)      PAST MEDICAL & SURGICAL HISTORY:  Seizure disorder      Hyperlipidemia      Anxiety disorder      Total knee replacement status  right        FAMILY HISTORY:    Social Hx:  Nonsmoker, no drug or alcohol use  Medications and Allergies ReviewedMEDICATIONS  (STANDING):  lacosamide Injectable 200 milliGRAM(s) IV Push once  lacosamide IVPB 100 milliGRAM(s) IV Intermittent every 12 hours  levETIRAcetam  IVPB 1500 milliGRAM(s) IV Intermittent every 12 hours  LORazepam   Injectable 1 milliGRAM(s) IV Push once  LORazepam   Injectable 1 milliGRAM(s) IV Push once  multivitamin 1 Tablet(s) Oral daily  naproxen 250 milliGRAM(s) Oral two times a day  sodium chloride 0.9%. 1000 milliLiter(s) (100 mL/Hr) IV Continuous <Continuous>     ROS: Pertinent positives in HPI, all other ROS were reviewed and are negative.      Examination:   Vital Signs Last 24 Hrs  T(C): 36.4 (29 May 2022 03:30), Max: 37.1 (29 May 2022 00:41)  T(F): 97.5 (29 May 2022 03:30), Max: 98.7 (29 May 2022 00:41)  HR: 66 (29 May 2022 08:48) (64 - 80)  BP: 146/63 (29 May 2022 08:48) (121/57 - 155/83)  BP(mean): 105 (29 May 2022 03:30) (77 - 124)  RR: 18 (29 May 2022 08:48) (15 - 22)  SpO2: 97% (29 May 2022 08:48) (92% - 99%)  General: Not cooperative, NAD   NECK: supple, no masses  Vascular : no carotid bruits,   Lungs: CTAB  Chest: RRR, no murmurs  Extremities: nontender, no edema  Musculoskeletal: no adventitious movements, no joint stiffness  Skin: no rash    Neurological Examination:  MS: Alert, non verbal, doesnt follow commands   CN: Blinks to threat, PERLL, EOMI, face symmetric, tongue midline, SCM equal bilaterally  Motor: normal bulk and tone, no observable tremor, briefly withdraws to pain bilat   Sens: as above.    Reflexes: 2/4 all over, downgoing toes b/l  Coord:  Unable to test   Gait: Cannot test    Comprehensive Metabolic Panel (05.28.22 @ 21:20)   Sodium, Serum: 139 mmol/L   Potassium, Serum: 4.3 mmol/L   Chloride, Serum: 106 mmol/L   Carbon Dioxide, Serum: 30 mmol/L   Anion Gap, Serum: 3 mmol/L   Blood Urea Nitrogen, Serum: 17 mg/dL   Creatinine, Serum: 0.83 mg/dL   Glucose, Serum: 100 mg/dL   Calcium, Total Serum: 8.9 mg/dL   Protein Total, Serum: 6.6 gm/dL   Albumin, Serum: 3.3 g/dL   Bilirubin Total, Serum: 0.4 mg/dL   Alkaline Phosphatase, Serum: 64 U/L   Aspartate Aminotransferase (AST/SGOT): 28 U/L   Alanine Aminotransferase (ALT/SGPT): 27 U/L   eGFR: 71:    Complete Blood Count + Automated Diff (05.28.22 @ 21:20)   WBC Count: 5.83 K/uL   RBC Count: 4.11 M/uL   Hemoglobin: 13.1 g/dL   Hematocrit: 38.8 %   Mean Cell Volume: 94.4 fl   Mean Cell Hemoglobin: 31.9 pg   Mean Cell Hemoglobin Conc: 33.8 gm/dL   Red Cell Distrib Width: 12.7 %   Platelet Count - Automated: 212 K/uL         < from: CT Brain Stroke Protocol (05.28.22 @ 21:42) >  PROCEDURE DATE:  05/28/2022          INTERPRETATION:  HISTORY: Stroke code.    COMPARISON: CT head 8/13/2013    TECHNIQUE: Axial noncontrast CT images from the skull base to the  vertex were obtained and submitted for interpretation. Coronal and sagittal   reformatted images were performed. Bone and soft tissue windows were   evaluated.    FINDINGS:    There is no acute intracranial mass-effect, hemorrhage, midline shift, or   abnormal extra-axial fluid collection. Gray-white differentiation is   maintained.    Mild chronic microvascular ischemic changes and chronic lacunar   infarction in the left corona radiata.    Mild generalized cerebral and cerebellar volume loss is present. No   hydrocephalus. Basal cisterns are patent.    Visualized paranasal sinuses and mastoid air cells are clear. Calvarium   is intact. 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    IMPRESSION:    No acute intracranial bleeding.  Mild chronic microvascular ischemic changes.    < end of copied text >      EEG: generalized spike and poly spike and wave discharges

## 2022-05-29 NOTE — SWALLOW BEDSIDE ASSESSMENT ADULT - SWALLOW EVAL: RECOMMENDED DIET
SUGGEST A REGULAR TEXTURE DIET WITH THIN LIQUIDS AS PATIENT TOLERATES THESE FOOD CONSISTENCIES FROM AN OROPHARYNGEAL SWALLOWING STANCE ON CLINICAL EXAM WHEN IN AN ALERT/CALM STATE. ASSISTANCE IS NEEDED TO FEED AND PT MUST BE FED ONLY WHEN IN AN ALERT/CALM STATE WHICH IS NOT ALWAYS THE CASE.

## 2022-05-29 NOTE — PROVIDER CONTACT NOTE (OTHER) - SITUATION
seizures    left message with ans service for dr to see pt in the morning [FreeTextEntry1] : Mrs. Daly is a delightful 69-year-old white female with a past medical history significant for hyperlipidemia, palpitations, and intermittent bundle branch block on EKG, who presents for follow up evaluation.  \par \par

## 2022-05-29 NOTE — SWALLOW BEDSIDE ASSESSMENT ADULT - SLP GENERAL OBSERVATIONS
The pt is awake. She is inattentive and internally distractible. Unable to direct to structured communication tasks. Pt followed a limited # of 1 step verbal commands & occasionally verbalized during communicative probes. At these times, her motor speech abilities were functional. 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 Above in setting of suspected seizure activity.

## 2022-05-29 NOTE — PATIENT PROFILE ADULT - FALL HARM RISK - HARM RISK INTERVENTIONS
Assistance with ambulation/Assistance OOB with selected safe patient handling equipment/Communicate Risk of Fall with Harm to all staff/Discuss with provider need for PT consult/Monitor gait and stability/Reinforce activity limits and safety measures with patient and family/Tailored Fall Risk Interventions/Visual Cue: Yellow wristband and red socks/Bed in lowest position, wheels locked, appropriate side rails in place/Call bell, personal items and telephone in reach/Instruct patient to call for assistance before getting out of bed or chair/Non-slip footwear when patient is out of bed/[place] to call system/Physically safe environment - no spills, clutter or unnecessary equipment/Purposeful Proactive Rounding/Room/bathroom lighting operational, light cord in reach

## 2022-05-29 NOTE — H&P ADULT - HISTORY OF PRESENT ILLNESS
83 y/o Female with a PMHx of seizures presented to the ED BIB EMS after change in speech.  stated patient began having difficulty speaking yesterday at 7 am, but stated it was similar to prior seizures.  gave patient 1 mg ativan PO, and patient slept after this. However, after waking up at 3-330 pm yesterday her symptoms were still present, and according to  reportedly worse. He gave her other seizure medications today as well, Keppra 1000 mg AM and PM doses, without improvement in her symptoms.   also stated patient had chronic shoulder pain s/p mechanical fall last year.  reported that pt does not shake during seizures and usually presents with changes in speech.     Family Hx:  Unable to get detail family history as patient is aphasic and confused.

## 2022-05-30 PROCEDURE — 95720 EEG PHY/QHP EA INCR W/VEEG: CPT

## 2022-05-30 PROCEDURE — 99232 SBSQ HOSP IP/OBS MODERATE 35: CPT

## 2022-05-30 RX ORDER — FOSPHENYTOIN 50 MG/ML
100 INJECTION INTRAMUSCULAR; INTRAVENOUS EVERY 8 HOURS
Refills: 0 | Status: ACTIVE | OUTPATIENT
Start: 2022-05-30 | End: 2023-04-28

## 2022-05-30 RX ORDER — LACOSAMIDE 50 MG/1
200 TABLET ORAL EVERY 12 HOURS
Refills: 0 | Status: ACTIVE | OUTPATIENT
Start: 2022-05-30 | End: 2022-06-06

## 2022-05-30 RX ORDER — FOSPHENYTOIN 50 MG/ML
1500 INJECTION INTRAMUSCULAR; INTRAVENOUS ONCE
Refills: 0 | Status: COMPLETED | OUTPATIENT
Start: 2022-05-30 | End: 2022-05-30

## 2022-05-30 RX ADMIN — Medication 1 TABLET(S): at 10:00

## 2022-05-30 RX ADMIN — LEVETIRACETAM 400 MILLIGRAM(S): 250 TABLET, FILM COATED ORAL at 09:54

## 2022-05-30 RX ADMIN — LACOSAMIDE 140 MILLIGRAM(S): 50 TABLET ORAL at 21:59

## 2022-05-30 RX ADMIN — FOSPHENYTOIN 60 MILLIGRAM(S) PE: 50 INJECTION INTRAMUSCULAR; INTRAVENOUS at 11:40

## 2022-05-30 RX ADMIN — Medication 250 MILLIGRAM(S): at 10:30

## 2022-05-30 RX ADMIN — SODIUM CHLORIDE 100 MILLILITER(S): 9 INJECTION INTRAMUSCULAR; INTRAVENOUS; SUBCUTANEOUS at 22:00

## 2022-05-30 RX ADMIN — Medication 250 MILLIGRAM(S): at 10:00

## 2022-05-30 RX ADMIN — LEVETIRACETAM 400 MILLIGRAM(S): 250 TABLET, FILM COATED ORAL at 22:36

## 2022-05-30 RX ADMIN — LACOSAMIDE 140 MILLIGRAM(S): 50 TABLET ORAL at 10:30

## 2022-05-30 RX ADMIN — SODIUM CHLORIDE 100 MILLILITER(S): 9 INJECTION INTRAMUSCULAR; INTRAVENOUS; SUBCUTANEOUS at 09:25

## 2022-05-30 RX ADMIN — Medication 250 MILLIGRAM(S): at 21:02

## 2022-05-30 RX ADMIN — FOSPHENYTOIN 104 MILLIGRAM(S) PE: 50 INJECTION INTRAMUSCULAR; INTRAVENOUS at 21:02

## 2022-05-31 LAB — PHENYTOIN FREE SERPL-MCNC: 20.9 UG/ML — HIGH (ref 10–20)

## 2022-05-31 PROCEDURE — 99232 SBSQ HOSP IP/OBS MODERATE 35: CPT

## 2022-05-31 PROCEDURE — 95720 EEG PHY/QHP EA INCR W/VEEG: CPT

## 2022-05-31 PROCEDURE — 99497 ADVNCD CARE PLAN 30 MIN: CPT

## 2022-05-31 PROCEDURE — 70553 MRI BRAIN STEM W/O & W/DYE: CPT | Mod: 26

## 2022-05-31 RX ADMIN — LEVETIRACETAM 400 MILLIGRAM(S): 250 TABLET, FILM COATED ORAL at 09:18

## 2022-05-31 RX ADMIN — FOSPHENYTOIN 104 MILLIGRAM(S) PE: 50 INJECTION INTRAMUSCULAR; INTRAVENOUS at 20:38

## 2022-05-31 RX ADMIN — ONDANSETRON 4 MILLIGRAM(S): 8 TABLET, FILM COATED ORAL at 23:01

## 2022-05-31 RX ADMIN — LACOSAMIDE 140 MILLIGRAM(S): 50 TABLET ORAL at 21:42

## 2022-05-31 RX ADMIN — SODIUM CHLORIDE 100 MILLILITER(S): 9 INJECTION INTRAMUSCULAR; INTRAVENOUS; SUBCUTANEOUS at 20:38

## 2022-05-31 RX ADMIN — FOSPHENYTOIN 104 MILLIGRAM(S) PE: 50 INJECTION INTRAMUSCULAR; INTRAVENOUS at 13:29

## 2022-05-31 RX ADMIN — Medication 250 MILLIGRAM(S): at 09:23

## 2022-05-31 RX ADMIN — FOSPHENYTOIN 104 MILLIGRAM(S) PE: 50 INJECTION INTRAMUSCULAR; INTRAVENOUS at 05:42

## 2022-05-31 RX ADMIN — SODIUM CHLORIDE 100 MILLILITER(S): 9 INJECTION INTRAMUSCULAR; INTRAVENOUS; SUBCUTANEOUS at 09:18

## 2022-05-31 RX ADMIN — LACOSAMIDE 140 MILLIGRAM(S): 50 TABLET ORAL at 10:12

## 2022-05-31 RX ADMIN — Medication 1 TABLET(S): at 09:23

## 2022-05-31 RX ADMIN — LEVETIRACETAM 400 MILLIGRAM(S): 250 TABLET, FILM COATED ORAL at 21:11

## 2022-05-31 RX ADMIN — Medication 250 MILLIGRAM(S): at 21:11

## 2022-06-01 LAB — PHENYTOIN FREE SERPL-MCNC: 29.8 UG/ML — HIGH (ref 10–20)

## 2022-06-01 PROCEDURE — 99232 SBSQ HOSP IP/OBS MODERATE 35: CPT

## 2022-06-01 PROCEDURE — 95816 EEG AWAKE AND DROWSY: CPT | Mod: 26

## 2022-06-01 RX ORDER — LACOSAMIDE 50 MG/1
100 TABLET ORAL
Refills: 0 | Status: DISCONTINUED | OUTPATIENT
Start: 2022-06-01 | End: 2022-06-03

## 2022-06-01 RX ORDER — LEVETIRACETAM 250 MG/1
1500 TABLET, FILM COATED ORAL
Refills: 0 | Status: DISCONTINUED | OUTPATIENT
Start: 2022-06-01 | End: 2022-06-03

## 2022-06-01 RX ADMIN — Medication 250 MILLIGRAM(S): at 09:12

## 2022-06-01 RX ADMIN — Medication 1 TABLET(S): at 09:11

## 2022-06-01 RX ADMIN — LEVETIRACETAM 1500 MILLIGRAM(S): 250 TABLET, FILM COATED ORAL at 21:08

## 2022-06-01 RX ADMIN — LACOSAMIDE 100 MILLIGRAM(S): 50 TABLET ORAL at 21:08

## 2022-06-01 RX ADMIN — LEVETIRACETAM 400 MILLIGRAM(S): 250 TABLET, FILM COATED ORAL at 09:11

## 2022-06-01 RX ADMIN — Medication 250 MILLIGRAM(S): at 21:08

## 2022-06-01 RX ADMIN — FOSPHENYTOIN 104 MILLIGRAM(S) PE: 50 INJECTION INTRAMUSCULAR; INTRAVENOUS at 05:32

## 2022-06-01 NOTE — PHYSICAL THERAPY INITIAL EVALUATION ADULT - MANUAL MUSCLE TESTING RESULTS, REHAB EVAL
KINGSLEY d/t MS but pt resistive to ROM at times-shld extensor str at least 4/5, shld IR str at least 4/5

## 2022-06-01 NOTE — PHYSICAL THERAPY INITIAL EVALUATION ADULT - GENERAL OBSERVATIONS, REHAB EVAL
pt rec'd supine in bed, sleeping, aroused w/ tactile stim but remained somnolent. pt not following commands, passive participant, speech slurried/dysarthric.

## 2022-06-01 NOTE — PHYSICAL THERAPY INITIAL EVALUATION ADULT - PLANNED THERAPY INTERVENTIONS, PT EVAL
oob activities when cleared for oob/bedrest D/C'd/balance training/bed mobility training/gait training/strengthening/transfer training

## 2022-06-02 ENCOUNTER — TRANSCRIPTION ENCOUNTER (OUTPATIENT)
Age: 82
End: 2022-06-02

## 2022-06-02 LAB — PHENYTOIN FREE SERPL-MCNC: 19.8 UG/ML — SIGNIFICANT CHANGE UP (ref 10–20)

## 2022-06-02 PROCEDURE — 99232 SBSQ HOSP IP/OBS MODERATE 35: CPT

## 2022-06-02 RX ORDER — EZETIMIBE 10 MG/1
1 TABLET ORAL
Qty: 0 | Refills: 0 | DISCHARGE

## 2022-06-02 RX ORDER — ESCITALOPRAM OXALATE 10 MG/1
1 TABLET, FILM COATED ORAL
Qty: 0 | Refills: 0 | DISCHARGE

## 2022-06-02 RX ORDER — LACOSAMIDE 50 MG/1
1 TABLET ORAL
Qty: 0 | Refills: 0 | DISCHARGE
Start: 2022-06-02

## 2022-06-02 RX ORDER — PREGABALIN 225 MG/1
1 CAPSULE ORAL
Qty: 0 | Refills: 0 | DISCHARGE

## 2022-06-02 RX ORDER — MAGNESIUM OXIDE 400 MG ORAL TABLET 241.3 MG
1 TABLET ORAL
Qty: 0 | Refills: 0 | DISCHARGE

## 2022-06-02 RX ORDER — GALANTAMINE HYDROBROMIDE 4 MG/1
1 TABLET, FILM COATED ORAL
Qty: 0 | Refills: 0 | DISCHARGE

## 2022-06-02 RX ORDER — LEVETIRACETAM 250 MG/1
1 TABLET, FILM COATED ORAL
Qty: 0 | Refills: 0 | DISCHARGE

## 2022-06-02 RX ORDER — CALCIUM CARBONATE 500(1250)
1 TABLET ORAL
Qty: 0 | Refills: 0 | DISCHARGE

## 2022-06-02 RX ADMIN — Medication 3 MILLIGRAM(S): at 01:05

## 2022-06-02 RX ADMIN — Medication 250 MILLIGRAM(S): at 13:34

## 2022-06-02 RX ADMIN — Medication 250 MILLIGRAM(S): at 21:25

## 2022-06-02 RX ADMIN — Medication 1 TABLET(S): at 09:43

## 2022-06-02 RX ADMIN — Medication 250 MILLIGRAM(S): at 12:43

## 2022-06-02 RX ADMIN — Medication 100 MILLIGRAM(S): at 12:43

## 2022-06-02 RX ADMIN — LACOSAMIDE 100 MILLIGRAM(S): 50 TABLET ORAL at 21:31

## 2022-06-02 RX ADMIN — LACOSAMIDE 100 MILLIGRAM(S): 50 TABLET ORAL at 12:43

## 2022-06-02 RX ADMIN — Medication 250 MILLIGRAM(S): at 19:40

## 2022-06-02 RX ADMIN — LEVETIRACETAM 1500 MILLIGRAM(S): 250 TABLET, FILM COATED ORAL at 21:25

## 2022-06-02 RX ADMIN — LEVETIRACETAM 1500 MILLIGRAM(S): 250 TABLET, FILM COATED ORAL at 09:43

## 2022-06-02 RX ADMIN — Medication 100 MILLIGRAM(S): at 21:25

## 2022-06-02 NOTE — PROGRESS NOTE ADULT - SUBJECTIVE AND OBJECTIVE BOX
82 year old woman hx of seizures, on Keppra 1000 mg BID, presents with change in  mental state, similar events in the past usually brake with Ativan. Continues minimally responsive. on admission Ct head shows no acute changes. EEG c/w status epilepticus, non convulsive. Give loading dose of Vimpat 200 mg x 1, continued on Keppra 1500 mg q12, and vimpat 100 mg q 12.    MEDICATIONS  (STANDING):  fosphenytoin IVPB 1500 milliGRAM(s) PE IV Intermittent once  lacosamide IVPB 200 milliGRAM(s) IV Intermittent every 12 hours  levETIRAcetam  IVPB 1500 milliGRAM(s) IV Intermittent every 12 hours  multivitamin 1 Tablet(s) Oral daily  naproxen 250 milliGRAM(s) Oral two times a day  sodium chloride 0.9%. 1000 milliLiter(s) (100 mL/Hr) IV Continuous <Continuous>    MEDICATIONS  (PRN):  acetaminophen     Tablet .. 650 milliGRAM(s) Oral every 6 hours PRN Temp greater or equal to 38C (100.4F), Mild Pain (1 - 3)  aluminum hydroxide/magnesium hydroxide/simethicone Suspension 30 milliLiter(s) Oral every 4 hours PRN Dyspepsia  melatonin 3 milliGRAM(s) Oral at bedtime PRN Insomnia  ondansetron Injectable 4 milliGRAM(s) IV Push every 8 hours PRN Nausea and/or Vomiting      Vital Signs Last 24 Hrs  T(C): 36.4 (30 May 2022 08:34), Max: 36.6 (29 May 2022 23:54)  T(F): 97.6 (30 May 2022 08:34), Max: 97.8 (29 May 2022 23:54)  HR: 66 (30 May 2022 08:34) (66 - 72)  BP: 131/51 (30 May 2022 08:34) (122/78 - 132/65)  RR: 18 (30 May 2022 08:34) (18 - 18)  SpO2: 100% (30 May 2022 08:34) (98% - 100%)  Neurological Exam:  MS: Alert, answers to name otherwise, doesn't follow commands   CN: Blinks to threat, PERLL, EOMI, face symmetric, tongue midline, SCM equal bilaterally  Motor: normal bulk and tone, no observable tremor, briefly withdraws to pain bilat   Sens: as above.    Reflexes: 2/4 all over, downgoing toes b/l  Coord:  Unable to test   Gait: Cannot test      < from: EEG Awake or Drowsy (05.29.22 @ 11:10) >    IMPRESSION: Abnormal EEG, because above described generalized   epileptiform discharges, and capture a generalized compulsive seizure.  These findings are consistent with status epilepticus.    < end of copied text >  
History of Present Illness:     patient is a 81 y/o Female with a PMHx of seizures presented to the ED BIB EMS after change in speech.  stated patient began having difficulty speaking yesterday at 7 am, but stated it was similar to prior seizures.  gave patient 1 mg ativan PO, and patient slept after this. However, after waking up at 3-330 pm yesterday her symptoms were still present, and according to  reportedly worse. He gave her other seizure medications today as well, Keppra 1000 mg AM and PM doses, without improvement in her symptoms.   also stated patient had chronic shoulder pain s/p mechanical fall last year.  reported that pt does not shake during seizures and usually presents with changes in speech.       Medical progress:   Complaints:  State of mind:     REVIEW OF SYSTEMS:  General: NAD, hemodynamically stable, (-)  fever, (-) chills, (-) weakness  HEENT:  Eyes:  No visual loss, blurred vision, double vision or yellow sclerae. Ears, Nose, Throat:  No hearing loss, sneezing, congestion, runny nose or sore throat.  SKIN:  No rash or itching.  CARDIOVASCULAR:  No chest pain, chest pressure or chest discomfort. No palpitations or edema.  RESPIRATORY:  No shortness of breath, cough or sputum.  GASTROINTESTINAL:  No anorexia, nausea, vomiting or diarrhea. No abdominal pain or blood.  NEUROLOGICAL:  No headache, dizziness, syncope, paralysis, ataxia, numbness or tingling in the extremities. No change in bowel or bladder control.  MUSCULOSKELETAL:  No muscle, back pain, joint pain or stiffness.  HEMATOLOGIC:  No anemia, bleeding or bruising.  LYMPHATICS:  No enlarged nodes. No history of splenectomy.  ENDOCRINOLOGIC:  No reports of sweating, cold or heat intolerance. No polyuria or polydipsia.  ALLERGIES:  No history of asthma, hives, eczema or rhinitis.      PHYSICAL EXAM:  ICU Vital Signs Last 24 Hrs  T(C): 36.7 (01 Jun 2022 08:54), Max: 36.9 (31 May 2022 20:23)  T(F): 98.1 (01 Jun 2022 08:54), Max: 98.4 (31 May 2022 20:23)  HR: 81 (01 Jun 2022 08:54) (81 - 89)  BP: 124/63 (01 Jun 2022 08:54) (123/70 - 135/69)  RR: 18 (01 Jun 2022 08:54) (18 - 18)  SpO2: 98% (01 Jun 2022 08:54) (98% - 99%)  Constitutional: weak appearing  HEENT: Atraumatic, DENA,   Respiratory: Breath Sounds normal, no rhonchi/wheeze  Cardiovascular: N S1S2;   Gastrointestinal: Abdomen soft, non tender, Bowel Sounds present  Extremities: No edema, peripheral pulses present  Neurological: awake, not alert or oriented, no gross focal motor deficits  Skin: Non cellulitic, no rash, ulcers  Lymph Nodes: No lymphadenopathy noted  Back: No CVA tenderness   Musculoskeletal: non tender  Breasts: Deferred  Genitourinary: deferred  Rectal: Deferred    All Labs:       # Uncontrolled Seizures: non convulsive status epilepticus  - keppra 1500 mg  po BID  - D/C fosphenytoin  - once bellow 20 ugm, restart phenytoin 100 mg po BID  - Vimpat 100 mg po BID -  5 days then 50 mg BId x 5 days then d/c.  - suggest PT evaluation    # SCD for DVT ppx     
History of Present Illness:   83 y/o Female with a PMHx of seizures presented to the ED BIB EMS after change in speech.  stated patient began having difficulty speaking yesterday at 7 am, but stated it was similar to prior seizures.  gave patient 1 mg ativan PO, and patient slept after this. However, after waking up at 3-330 pm yesterday her symptoms were still present, and according to  reportedly worse. He gave her other seizure medications today as well, Keppra 1000 mg AM and PM doses, without improvement in her symptoms.   also stated patient had chronic shoulder pain s/p mechanical fall last year.  reported that pt does not shake during seizures and usually presents with changes in speech.     5.29: RR called for tonic-clonic seizures; ativan given  now patient is somnolent, postictal       A/P:    1. Uncontrolled Seizures:   Ativan given prn  Keppra 1500mg IV Bid  Vinpat 100mg IV Bid  Neuro evaluation   repeat EEG    
History of Present Illness:   83 y/o Female with a PMHx of seizures presented to the ED BIB EMS after change in speech.  stated patient began having difficulty speaking yesterday at 7 am, but stated it was similar to prior seizures.  gave patient 1 mg ativan PO, and patient slept after this. However, after waking up at 3-330 pm yesterday her symptoms were still present, and according to  reportedly worse. He gave her other seizure medications today as well, Keppra 1000 mg AM and PM doses, without improvement in her symptoms.   also stated patient had chronic shoulder pain s/p mechanical fall last year.  reported that pt does not shake during seizures and usually presents with changes in speech.     .: RR called for tonic-clonic seizures; ativan given  now patient is somnolent, postictal     : pt with AMS, non verbal      PHYSICAL EXAM:    Daily     Daily     Vital Signs Last 24 Hrs  T(C): 36.4 (30 May 2022 08:34), Max: 36.6 (29 May 2022 23:54)  T(F): 97.6 (30 May 2022 08:34), Max: 97.8 (29 May 2022 23:54)  HR: 66 (30 May 2022 08:34) (66 - 72)  BP: 131/51 (30 May 2022 08:34) (122/78 - 132/65)  BP(mean): --  RR: 18 (30 May 2022 08:34) (18 - 18)  SpO2: 100% (30 May 2022 08:34) (98% - 100%)    Constitutional: AMS appearing  HEENT: Atraumatic, DENA,   Respiratory: Breath Sounds normal, no rhonchi/wheeze  Cardiovascular: N S1S2;   Gastrointestinal: Abdomen soft, non tender, Bowel Sounds present  Extremities: No edema, peripheral pulses present  Neurological: awake, not alert or oriented, no gross focal motor deficits  Skin: Non cellulitic, no rash, ulcers  Lymph Nodes: No lymphadenopathy noted  Back: No CVA tenderness   Musculoskeletal: non tender  Breasts: Deferred  Genitourinary: deferred  Rectal: Deferred    All Labs/EKG/Radiology/Meds reviewed by me                          13.6   6.21  )-----------( 192      ( 29 May 2022 11:06 )             40.0       CBC Full  -  ( 29 May 2022 11:06 )  WBC Count : 6.21 K/uL  RBC Count : 4.24 M/uL  Hemoglobin : 13.6 g/dL  Hematocrit : 40.0 %  Platelet Count - Automated : 192 K/uL  Mean Cell Volume : 94.3 fl  Mean Cell Hemoglobin : 32.1 pg  Mean Cell Hemoglobin Concentration : 34.0 gm/dL  Auto Neutrophil # : x  Auto Lymphocyte # : x  Auto Monocyte # : x  Auto Eosinophil # : x  Auto Basophil # : x  Auto Neutrophil % : x  Auto Lymphocyte % : x  Auto Monocyte % : x  Auto Eosinophil % : x  Auto Basophil % : x          139  |  107  |  17  ----------------------------<  112<H>  3.6   |  22  |  0.89    Ca    9.2      29 May 2022 11:06    TPro  6.8  /  Alb  3.6  /  TBili  0.4  /  DBili  x   /  AST  23  /  ALT  24  /  AlkPhos  62        LIVER FUNCTIONS - ( 29 May 2022 11:06 )  Alb: 3.6 g/dL / Pro: 6.8 gm/dL / ALK PHOS: 62 U/L / ALT: 24 U/L / AST: 23 U/L / GGT: x             PT/INR - ( 28 May 2022 21:20 )   PT: 13.5 sec;   INR: 1.16 ratio         PTT - ( 28 May 2022 21:20 )  PTT:29.8 sec          Urinalysis Basic - ( 28 May 2022 23:27 )    Color: Yellow / Appearance: Clear / S.010 / pH: x  Gluc: x / Ketone: Negative  / Bili: Negative / Urobili: Negative   Blood: x / Protein: Negative / Nitrite: Negative   Leuk Esterase: Small / RBC: 0-2 /HPF / WBC 11-25   Sq Epi: x / Non Sq Epi: Occasional / Bacteria: Few            MEDICATIONS  (STANDING):  fosphenytoin IVPB 1500 milliGRAM(s) PE IV Intermittent once  lacosamide IVPB 200 milliGRAM(s) IV Intermittent every 12 hours  levETIRAcetam  IVPB 1500 milliGRAM(s) IV Intermittent every 12 hours  multivitamin 1 Tablet(s) Oral daily  naproxen 250 milliGRAM(s) Oral two times a day  sodium chloride 0.9%. 1000 milliLiter(s) (100 mL/Hr) IV Continuous <Continuous>    MEDICATIONS  (PRN):  acetaminophen     Tablet .. 650 milliGRAM(s) Oral every 6 hours PRN Temp greater or equal to 38C (100.4F), Mild Pain (1 - 3)  aluminum hydroxide/magnesium hydroxide/simethicone Suspension 30 milliLiter(s) Oral every 4 hours PRN Dyspepsia  melatonin 3 milliGRAM(s) Oral at bedtime PRN Insomnia  ondansetron Injectable 4 milliGRAM(s) IV Push every 8 hours PRN Nausea and/or Vomiting      
HPI:  81 y/o Female with a PMHx of seizures presented to the ED BIB EMS after change in MS, hx of partial seizures on keppra 1000 mg BID. while hospitalized had 1 GTC seizure. Placed on video EEG monitor, showed non convulsive status, tried on higer dose of Keppra 1500 mg BID, Vimpat, theen brke with fosphenytoin. Now arouses, answers questions.       MEDICATIONS  (STANDING):  lacosamide 100 milliGRAM(s) Oral two times a day  levETIRAcetam 1500 milliGRAM(s) Oral two times a day  multivitamin 1 Tablet(s) Oral daily  naproxen 250 milliGRAM(s) Oral two times a day  phenytoin   Capsule 100 milliGRAM(s) Oral two times a day    MEDICATIONS  (PRN):  acetaminophen     Tablet .. 650 milliGRAM(s) Oral every 6 hours PRN Temp greater or equal to 38C (100.4F), Mild Pain (1 - 3)  aluminum hydroxide/magnesium hydroxide/simethicone Suspension 30 milliLiter(s) Oral every 4 hours PRN Dyspepsia  melatonin 3 milliGRAM(s) Oral at bedtime PRN Insomnia  ondansetron Injectable 4 milliGRAM(s) IV Push every 8 hours PRN Nausea and/or Vomiting      Vital Signs Last 24 Hrs  T(C): 36.3 (01 Jun 2022 19:54), Max: 36.5 (01 Jun 2022 16:10)  T(F): 97.4 (01 Jun 2022 19:54), Max: 97.7 (01 Jun 2022 16:10)  HR: 90 (02 Jun 2022 08:38) (85 - 90)  BP: 114/96 (02 Jun 2022 08:38) (114/96 - 125/59)  RR: 18 (02 Jun 2022 08:38) (18 - 18)  SpO2: 97% (02 Jun 2022 08:38) (97% - 99%)  Neurological Exam:  HF: Patient is alert and oriented x 2. There is mild dysarthria. Follows commands.   CN: Pupils are equal and reactive. Extra ocular muscles are intact. There is no facial droop or asymmetry. Tongue is midline. Sensation is intact in the face. Other CN II-XII are intact.   Motor: motor examination all muscles are 4/5 and there is no pronator drift.   Sensory: intact to touch.   DTR: 2/4 all 4 extremities. Babinski is negative bilateral.  Co-ord:  Limited no ross dysmetria  Gait/balance: not tested.       Phenytoin Level, Serum: 19.8 ug/mL (06.02.22 @ 08:00)   Phenytoin Level, Serum: 29.8 ug/mL (06.01.22 @ 07:46)   Phenytoin Level, Serum: 20.9 ug/mL (05.31.22 @ 07:55)     < from: MR Head w/wo IV Cont (05.31.22 @ 17:53) >    IMPRESSION:  Mild periventricular and bifrontal biparietal subcortical   white matter ischemia. Following gadolinium administration no abnormal   enhancement occurs.    < end of copied text >      < from: EEG Awake or Drowsy (06.01.22 @ 10:00) >    IMPRESSION: Abnormal EEG because of mild slowing of the background   activity, consistent with a diffuse cerebral dysfunction, maybe on the   basis of a diffuse metabolic, toxic or structural abnormality.    < end of copied text >  
HPI:  81 y/o Female with a PMHx of seizures presented to the ED BIB EMS after change in MS, hx similar to prior seizures. On admission head Ct showed no acute changes, EEG c/w status epilepticus, no convulsive. keppra increased to 1500 mg BID; Vimpat added, now 200 mg BID, video EEG showed persistent generaalized s/w yesterday fosphenytoin added, with EEG improvement. Today awake, answering questions..     MEDICATIONS  (STANDING):  fosphenytoin IVPB 100 milliGRAM(s) PE IV Intermittent every 8 hours  lacosamide IVPB 200 milliGRAM(s) IV Intermittent every 12 hours  levETIRAcetam  IVPB 1500 milliGRAM(s) IV Intermittent every 12 hours  multivitamin 1 Tablet(s) Oral daily  naproxen 250 milliGRAM(s) Oral two times a day  sodium chloride 0.9%. 1000 milliLiter(s) (100 mL/Hr) IV Continuous <Continuous>    MEDICATIONS  (PRN):  acetaminophen     Tablet .. 650 milliGRAM(s) Oral every 6 hours PRN Temp greater or equal to 38C (100.4F), Mild Pain (1 - 3)  aluminum hydroxide/magnesium hydroxide/simethicone Suspension 30 milliLiter(s) Oral every 4 hours PRN Dyspepsia  melatonin 3 milliGRAM(s) Oral at bedtime PRN Insomnia  ondansetron Injectable 4 milliGRAM(s) IV Push every 8 hours PRN Nausea and/or Vomiting      Vital Signs Last 24 Hrs  T(C): 36.8 (31 May 2022 08:09), Max: 36.8 (30 May 2022 21:25)  T(F): 98.2 (31 May 2022 08:09), Max: 98.2 (30 May 2022 21:25)  HR: 82 (31 May 2022 08:09) (76 - 95)  BP: 134/59 (31 May 2022 08:09) (134/59 - 144/75)  RR: 18 (31 May 2022 08:09) (18 - 18)  SpO2: 96% (31 May 2022 08:09) (95% - 100%)  HF: Patient is alert and oriented x 3. There is dysarthria. Follows commands.   CN: Vision is intact to confrontation. Pupils are equal and reactive. Extra ocular muscles are intact. There is no facial droop or asymmetry. Tongue is midline. Sensation is intact in the face. Other CN II-XII are intact.   Motor: motor examination all muscles are 3-4/5 and there is no pronator drift.   Sensory: intact to  touch.   DTR:1- 2/4 all 4 extremities. Babinski is negative bilateral.  Co-ord:  Limited no gross dysmetria of UEs       LABS:  Comprehensive Metabolic Panel (05.29.22 @ 11:06)   Sodium, Serum: 139 mmol/L   Potassium, Serum: 3.6 mmol/L   Chloride, Serum: 107 mmol/L   Carbon Dioxide, Serum: 22 mmol/L   Anion Gap, Serum: 10 mmol/L   Blood Urea Nitrogen, Serum: 17 mg/dL   Creatinine, Serum: 0.89 mg/dL   Glucose, Serum: 112 mg/dL   Calcium, Total Serum: 9.2 mg/dL   Protein Total, Serum: 6.8 gm/dL   Albumin, Serum: 3.6 g/dL   Bilirubin Total, Serum: 0.4 mg/dL   Alkaline Phosphatase, Serum: 62 U/L   Aspartate Aminotransferase (AST/SGOT): 23 U/L   Alanine Aminotransferase (ALT/SGPT): 24 U/L   eGFR: 66:    Complete Blood Count (05.29.22 @ 11:06)   WBC Count: 6.21 K/uL   RBC Count: 4.24 M/uL   Hemoglobin: 13.6 g/dL   WBC Count: 6.21 K/uL   RBC Count: 4.24 M/uL   Hemoglobin: 13.6 g/dL   Hematocrit: 40.0 %   Mean Cell Volume: 94.3 fl   Mean Cell Hemoglobin: 32.1 pg   Mean Cell Hemoglobin Conc: 34.0 gm/dL   Red Cell Distrib Width: 12.6 %   Platelet Count - Automated: 192 K/uL   Phenytoin Level, Serum: 20.9 ug/mL (05.31.22 @ 07:55)     imaging  < from: CT Brain Stroke Protocol (05.28.22 @ 21:42) >    IMPRESSION:    No acute intracranial bleeding.  Mild chronic microvascular ischemic changes.    < end of copied text >    < from: EEG w/ Video Each 12-26 Hours, Unmonitored (05.30.22 @ 12:10) >  The EKG portion of the record demonstrates a sinus tachycardia with   occasional PVCs.    IMPRESSION: Very abnormal 24 hour video EEG, because of continuous   generalized spike and wave discharges consistent with partial status.  Clinical correlation recommended.    
HPI:  83 y/o Female with a PMHx of seizures presented to the ED BIB EMS after change in speech, lethargy, unresponsive Found to be in non convulsive status, broke with the addition of fosphenytoin. Today more alert, conversant.    ROS:     MEDICATIONS  (STANDING):  fosphenytoin IVPB 100 milliGRAM(s) PE IV Intermittent every 8 hours  lacosamide IVPB 200 milliGRAM(s) IV Intermittent every 12 hours  levETIRAcetam  IVPB 1500 milliGRAM(s) IV Intermittent every 12 hours  multivitamin 1 Tablet(s) Oral daily  naproxen 250 milliGRAM(s) Oral two times a day  sodium chloride 0.9%. 1000 milliLiter(s) (100 mL/Hr) IV Continuous <Continuous>    MEDICATIONS  (PRN):  acetaminophen     Tablet .. 650 milliGRAM(s) Oral every 6 hours PRN Temp greater or equal to 38C (100.4F), Mild Pain (1 - 3)  aluminum hydroxide/magnesium hydroxide/simethicone Suspension 30 milliLiter(s) Oral every 4 hours PRN Dyspepsia  melatonin 3 milliGRAM(s) Oral at bedtime PRN Insomnia  ondansetron Injectable 4 milliGRAM(s) IV Push every 8 hours PRN Nausea and/or Vomiting      Vital Signs Last 24 Hrs  T(C): 36.7 (01 Jun 2022 08:54), Max: 36.9 (31 May 2022 20:23)  T(F): 98.1 (01 Jun 2022 08:54), Max: 98.4 (31 May 2022 20:23)  HR: 81 (01 Jun 2022 08:54) (81 - 89)  BP: 124/63 (01 Jun 2022 08:54) (123/70 - 135/69)  RR: 18 (01 Jun 2022 08:54) (18 - 18)  SpO2: 98% (01 Jun 2022 08:54) (98% - 99%)  Neurological Exam:  HF: Patient is alert and oriented x 1. There is mild dysarthria. Follows commands.   CN: Pupils are equal and reactive. Extra ocular muscles are intact. There is no facial droop or asymmetry. Tongue is midline. Sensation is intact in the face. Other CN II-XII are intact.   Motor: motor examination all muscles are 4/5 and there is no pronator drift.   Sensory: intact to  touch.   DTR: 2/4 all 4 extremities. Babinski is negative bilateral.  Co-ord:  Finger to finger to nose is intact.   Gait/balance: Not tested.     Lab  Phenytoin Level, Serum: 29.8 ug/mL (06.01.22 @ 07:46)   Phenytoin Level, Serum: 20.9 ug/mL (05.31.22 @ 07:55)    < from: MR Head w/wo IV Cont (05.31.22 @ 17:53) >  FINDINGS:   No prior similar studies are available for review.    The brain demonstrates mild periventricular and bifrontal biparietal   subcortical white matter ischemia. Following gadolinium administration no   abnormal enhancement occurs.  No acute cerebral cortical infarct is   found.   No intracranial hemorrhage is recognized.  No mass effect is   found in the brain.    The ventricles, sulci and basal cisterns appear unremarkable.    The vertebral and internal carotid arteries demonstrate expected flow   voids indicating their patency.    The orbits are unremarkable.  The paranasal sinuses are clear. The nasal   cavity appears intact.  The nasopharynx is symmetric.  The central skull   base and petrous temporal bones are intact.  The calvarium appears   unremarkable.      IMPRESSION:  Mild periventricular and bifrontal biparietal subcortical   white matter ischemia. Following gadolinium administration no abnormal   enhancement occurs.    < end of copied text >      < from: EEG w/ Video Each 12-26 Hours, Unmonitored (05.31.22 @ 09:30) >  IMPRESSION: Abnormal 24 hour video EEG, because at the start of the   recording there where, generalized epileptiform activity consistent with   an underlying nonconvulsive seizure, with eventual termination and then   diffuse cerebral dysfunction, due to mild slowing of the background   activity, perhaps secondary to diffuse metabolic, toxic, structural   abnormality.  Clinical correlation recommended.    < end of copied text >  
History of Present Illness:   81 y/o Female with a PMHx of seizures presented to the ED BIB EMS after change in speech.  stated patient began having difficulty speaking yesterday at 7 am, but stated it was similar to prior seizures.  gave patient 1 mg ativan PO, and patient slept after this. However, after waking up at 3-330 pm yesterday her symptoms were still present, and according to  reportedly worse. He gave her other seizure medications today as well, Keppra 1000 mg AM and PM doses, without improvement in her symptoms.   also stated patient had chronic shoulder pain s/p mechanical fall last year.  reported that pt does not shake during seizures and usually presents with changes in speech.     : RR called for tonic-clonic seizures; ativan given  now patient is somnolent, postictal     : pt with AMS, non verbal    : pt more awake and alert today, trying to speak full sentences  repeat 24 Hr video EEG showed termination of seizure activity      PHYSICAL EXAM:    Daily     Daily     Vital Signs Last 24 Hrs  T(C): 36.8 (31 May 2022 08:09), Max: 36.8 (30 May 2022 21:25)  T(F): 98.2 (31 May 2022 08:09), Max: 98.2 (30 May 2022 21:25)  HR: 82 (31 May 2022 08:09) (76 - 95)  BP: 134/59 (31 May 2022 08:09) (134/59 - 144/75)  BP(mean): --  RR: 18 (31 May 2022 08:09) (18 - 18)  SpO2: 96% (31 May 2022 08:09) (95% - 100%))    Constitutional: weak appearing  HEENT: Atraumatic, DENA,   Respiratory: Breath Sounds normal, no rhonchi/wheeze  Cardiovascular: N S1S2;   Gastrointestinal: Abdomen soft, non tender, Bowel Sounds present  Extremities: No edema, peripheral pulses present  Neurological: awake, not alert or oriented, no gross focal motor deficits  Skin: Non cellulitic, no rash, ulcers  Lymph Nodes: No lymphadenopathy noted  Back: No CVA tenderness   Musculoskeletal: non tender  Breasts: Deferred  Genitourinary: deferred  Rectal: Deferred    All Labs/EKG/Radiology/Meds reviewed by me                          13.6   6.21  )-----------( 192      ( 29 May 2022 11:06 )             40.0       CBC Full  -  ( 29 May 2022 11:06 )  WBC Count : 6.21 K/uL  RBC Count : 4.24 M/uL  Hemoglobin : 13.6 g/dL  Hematocrit : 40.0 %  Platelet Count - Automated : 192 K/uL  Mean Cell Volume : 94.3 fl  Mean Cell Hemoglobin : 32.1 pg  Mean Cell Hemoglobin Concentration : 34.0 gm/dL  Auto Neutrophil # : x  Auto Lymphocyte # : x  Auto Monocyte # : x  Auto Eosinophil # : x  Auto Basophil # : x  Auto Neutrophil % : x  Auto Lymphocyte % : x  Auto Monocyte % : x  Auto Eosinophil % : x  Auto Basophil % : x          139  |  107  |  17  ----------------------------<  112<H>  3.6   |  22  |  0.89    Ca    9.2      29 May 2022 11:06    TPro  6.8  /  Alb  3.6  /  TBili  0.4  /  DBili  x   /  AST  23  /  ALT  24  /  AlkPhos  62  05-29      LIVER FUNCTIONS - ( 29 May 2022 11:06 )  Alb: 3.6 g/dL / Pro: 6.8 gm/dL / ALK PHOS: 62 U/L / ALT: 24 U/L / AST: 23 U/L / GGT: x             PT/INR - ( 28 May 2022 21:20 )   PT: 13.5 sec;   INR: 1.16 ratio         PTT - ( 28 May 2022 21:20 )  PTT:29.8 sec          Urinalysis Basic - ( 28 May 2022 23:27 )    Color: Yellow / Appearance: Clear / S.010 / pH: x  Gluc: x / Ketone: Negative  / Bili: Negative / Urobili: Negative   Blood: x / Protein: Negative / Nitrite: Negative   Leuk Esterase: Small / RBC: 0-2 /HPF / WBC 11-25   Sq Epi: x / Non Sq Epi: Occasional / Bacteria: Few      MEDICATIONS  (STANDING):  fosphenytoin IVPB 100 milliGRAM(s) PE IV Intermittent every 8 hours  lacosamide IVPB 200 milliGRAM(s) IV Intermittent every 12 hours  levETIRAcetam  IVPB 1500 milliGRAM(s) IV Intermittent every 12 hours  multivitamin 1 Tablet(s) Oral daily  naproxen 250 milliGRAM(s) Oral two times a day  sodium chloride 0.9%. 1000 milliLiter(s) (100 mL/Hr) IV Continuous <Continuous>    MEDICATIONS  (PRN):  acetaminophen     Tablet .. 650 milliGRAM(s) Oral every 6 hours PRN Temp greater or equal to 38C (100.4F), Mild Pain (1 - 3)  aluminum hydroxide/magnesium hydroxide/simethicone Suspension 30 milliLiter(s) Oral every 4 hours PRN Dyspepsia  melatonin 3 milliGRAM(s) Oral at bedtime PRN Insomnia  ondansetron Injectable 4 milliGRAM(s) IV Push every 8 hours PRN Nausea and/or Vomiting

## 2022-06-02 NOTE — DISCHARGE NOTE PROVIDER - NSDCMRMEDTOKEN_GEN_ALL_CORE_FT
Keppra 1000 mg oral tablet: 1.5 tab(s) orally 2 times a day  lacosamide 100 mg oral tablet: 1 tab(s) orally 2 times a day  phenytoin 100 mg oral capsule, extended release: 1 cap(s) orally 2 times a day

## 2022-06-02 NOTE — DISCHARGE NOTE PROVIDER - CARE PROVIDER_API CALL
Nemesio Delarosa)  Internal Medicine; Neurology  [address] [facility], [address]  [place] [address]  Phone: [phone]  Fax: [phone]  Follow Up Time:    Nemesio Delarosa)  Internal Medicine; Neurology  [address] [facility], [address]  [place] [address]  Phone: [phone]  Fax: [phone]  Follow Up Time:     Mattie Horton)  Internal Medicine  [address] [place] [address]  Phone: [phone]  Fax: [phone]  Follow Up Time:

## 2022-06-02 NOTE — DISCHARGE NOTE PROVIDER - PROVIDER TOKENS
PROVIDER:[TOKEN:[[id number]]] PROVIDER:[TOKEN:[[id number]]],PROVIDER:[TOKEN:[[id number]]]

## 2022-06-02 NOTE — DISCHARGE NOTE PROVIDER - NSDCCPTREATMENT_GEN_ALL_CORE_FT
PRINCIPAL PROCEDURE  Procedure: EEG awake and asleep  Findings and Treatment: Abnormal EEG because of mild slowing of the background   activity, consistent with a diffuse cerebral dysfunction, maybe on the   basis of a diffuse metabolic, toxic or structural abnormality.        SECONDARY PROCEDURE  Procedure: MRI head  Findings and Treatment: Mild periventricular and bifrontal biparietal subcortical   white matter ischemia. Following gadolinium administration no abnormal   enhancement occurs.

## 2022-06-02 NOTE — PROGRESS NOTE ADULT - ASSESSMENT
82 year old woman hx of seizure disorder, presented in non convulsive status. no obvious trigger, MR head no acute changes, no lab abnormalities. Much improved. repeat EEG shows no seizure activity today.  Plan:  continue keppra 1500 mg  (change to po), po BID  D/C fosphenytoin; f/u DPH serum level in AM tomorrow, once bellow 20 ugm, restart phenytoin 100 mg po BID  lower lacosamide to 100 mg po BID, 5 days then 50 mg BId x 5 days then d/c.  suggest PT evaluation
82 year old woman hx of seizures, presented with non convulsive status epilepticus. improved. Cause?, no obvious triggers, no infection, labs okay. ? CVA.  Suggests:  -continue IV Keppra, 1500 mg q 12  -continue Vimpat 200 mg, IV q 12  -continue fosphenytoin 100 mg q 8, level is borderline high  -follow fosphenytoin levels, aim 10-20 ugm  -EEG in Am tomorrow  -MR head w/wo
A/P:    1. Uncontrolled Seizures: non convulsive status epilepticus  Ativan given prn  Keppra 1500mg IV Bid  Vimpat 100mg IV Bid  fosphenytoin 100 mg  iv q 8 hrs  Neuro evaluation appreciated  fall/seizure/aspiration precautions    2.  SCD for DVT ppx     GOC and advance care planning meeting done with the patient's daughter, Trevor. She wishes her mother to be fully resuscitated and mechanically ventilated if need arises. There are no limitations of any sort in her medical care and management. She is FULL CODE. Additional time spent 18 min.   
82 year old woman hx of seizure disorder, presented in non convulsive status. no obvious trigger, MR head no acute changes, no lab abnormalities. Much improved. repeat EEG shows no seizure activity yesterday. Repeat DPH 19, today.  Plan:  continue keppra 1500 mg  (change to po), po BID  phenytoin 100 mg po BID  repeat DPH serum level tomorrow  lower lacosamide to 100 mg po BID, 5 days then 50 mg BId x 5 days then d/c.  PT evaluation    Discussed with Dr. Covington
A/P:    1. Uncontrolled Seizures:   Ativan given prn  Keppra 1500mg IV Bid  Vimpat 100mg IV Bid  Neuro evaluation appreciated  repeat EEG in progress, 1st one positive for seizures  fall/seizure/aspiration precautions    2.  SCD for DVT ppx     3.  Code status: Patient is in full code status.   
82 year old woman hx of seizures, on Keppra 1000 mg BID; presents with  status epilepticus, non convulsive.   Suggests:  -continue IV Keppra, 1500 mg q 12  -increase Vimpat 200 mg, IV q 12  -fosphenytoin loading dose 1500 mg x 1  -continue fosphenytoin 100 mg q 8  -follow fosphenytoin levels, aim 10-20 ugm  -EEG monitor

## 2022-06-02 NOTE — DISCHARGE NOTE PROVIDER - NSDCQMERRANDS_GEN_ALL_CORE
After Visit Summary   9/5/2017    Clarita Lowry    MRN: [id number]           Patient Information     Date Of Birth          1950        Visit Information        Provider Department      9/5/2017 9:20 AM Claire Amos APRN [facility]        Today's Diagnoses     Hyperlipidemia LDL goal <160    -  1    Essential hypertension with goal blood pressure less than 140/90        Asymptomatic postmenopausal status          Care Instructions      Discharge Instructions for High Blood Pressure (Hypertension)  You have been diagnosed with high blood pressure (also called hypertension). This means the force of blood against your artery walls is too strong. It also means your heart is working hard to move blood. High blood pressure usually has no symptoms, but over time, it can damage your heart, blood vessels, eyes, kidneys, and other organs. With help from your doctor, you can manage your blood pressure and protect your health.  Taking medicine    Learn to take your own blood pressure. Keep a record of your results. Ask your doctor which readings mean that you need medical attention.    Take your blood pressure medicine exactly as directed. Don t skip doses. Missing doses can cause your blood pressure to get out of control.    If you do miss a dose (or doses) check with your healthcare provider about what to do.    Avoid medicine that contain heart stimulants, including over-the-counter drugs. Check for warnings about high blood pressure on the label. Ask the pharmacist before purchasing something you haven't used before    Check with your doctor or pharmacist before taking a decongestant. Some decongestants can worsen high blood pressure.  Lifestyle changes    Maintain a healthy weight. Get help to lose any extra pounds.    Cut back on salt.    Limit canned, dried, packaged, and fast foods.    Don t add salt to your food at the table.    Season foods with herbs instead of  "salt when you cook.    Request no added salt when you go to a restaurant.    The American Heart Association s (AHA) \"ideal\" sodium intake recommendation is 1,500 milligrams per day.  However, since American's eat so much salt, the AHA says a positive change can occur by cutting back to even 2,400 milligrams of sodium a day.     Follow the DASH (Dietary Approaches to Stop Hypertension) eating plan. This plan recommends vegetables, fruits, whole gains, and other heart healthy foods.    Begin an exercise program. Ask your doctor how to get started. The American Heart Association recommends aerobic exercise 3 to 4 times a week for an average of 40 minutes at a time, with your doctor's approval. Simple activities like walking or gardening can help.    Break the smoking habit. Enroll in a stop-smoking program to improve your chances of success. Ask your healthcare provider about programs and medicines to help you stop smoking.    Limit drinks that contain caffeine (coffee, black or green tea, cola) to 2 per day.    Never take stimulants such as amphetamines or cocaine; these drugs can be deadly for someone with high blood pressure.    Control your stress. Learn stress-management techniques.    Limit alcohol to no more than 1 drink a day for women and 2 drinks a day for men.  Follow-up care  Make a follow-up appointment as directed by our staff.     When to seek medical care  Call your doctor immediately or seek emergency care if you have any of the following:    Chest pain or shortness of breath (call 911)    Moderate to severe headache    Weakness in the muscles of your face, arms, or legs    Trouble speaking    Extreme drowsiness    Confusion    Fainting or dizziness    Pulsating or rushing sound in your ears    Unexplained nosebleed    Weakness, tingling, or numbness of your face, arms, or legs    Change in vision    Blood pressure measured at home that is greater than 180/110   Date Last Reviewed: 4/27/2016    " "[id number] [employer] [facility]. [address] [place] [address], [place] [address]. All rights reserved. This information is not intended as a substitute for professional medical care. Always follow your healthcare professional's instructions.                Follow-ups after your visit        Future tests that were ordered for you today     Open Future Orders        Priority Expected Expires Ordered    DX Hip/Pelvis/Spine Routine  9/5/2018 9/5/2017    **Hepatitis C Screen Reflex to RNA FUTURE anytime Routine 9/5/2017 9/5/2018 9/5/2017    **Comprehensive metabolic panel FUTURE anytime Routine 9/5/2017 9/5/2018 9/5/2017    **Lipid panel reflex to direct LDL FUTURE 2mo Routine 11/4/2017 1/3/2018 9/5/2017            Who to contact     If you have questions or need follow up information about today's clinic visit or your schedule please contact [facility] directly at [phone].  Normal or non-critical lab and imaging results will be communicated to you by Tinitellhart, letter or phone within 4 business days after the clinic has received the results. If you do not hear from us within 7 days, please contact the clinic through Tinitellhart or phone. If you have a critical or abnormal lab result, we will notify you by phone as soon as possible.  Submit refill requests through [employer] or call your pharmacy and they will forward the refill request to us. Please allow 3 business days for your refill to be completed.          Additional Information About Your Visit        Tinitellhart Information     [employer] lets you send messages to your doctor, view your test results, renew your prescriptions, schedule appointments and more. To sign up, go to [email or web address] . Click on \"Log in\" on the left side of the screen, which will take you to the Welcome page. Then click on \"Sign up Now\" on the right side of the page.     You will be asked to enter the access code listed below, as well as some personal information. Please follow " the directions to create your username and password.     Your access code is: [email or web address]  Expires: 2017  9:57 AM     Your access code will  in 90 days. If you need help or a new code, please call your [facility] clinic or [phone].        Care EveryWhere ID     This is your Care EveryWhere ID. This could be used by other organizations to access your [facility] medical records  [id number]        Your Vitals Were     Pulse Temperature Respirations Pulse Oximetry BMI (Body Mass Index)       72 97.3  F (36.3  C) (Tympanic) 14 99% 21.46 kg/m2        Blood Pressure from Last 3 Encounters:   17 120/62   16 130/64   16 134/60    Weight from Last 3 Encounters:   17 125 lb (56.7 kg)   16 131 lb 6.4 oz (59.6 kg)   16 130 lb (59 kg)                 Where to get your medicines      These medications were sent to [facility] [address] [place] [address] [address] [place] [address]  950 [address] [address]     Phone:  [phone]     metoprolol 25 MG 24 hr tablet          Primary Care Provider Office Phone # Fax #    LANA Wise [facility] [phone]       [address] [facility] [address]        Equal Access to Services     GREG VALVERDE AH: Hadii franco kelloggo Sojoselin, waaxda luqadaha, qaybta kaalmaradha miranda, markell richardson . So [facility] [phone].    ATENCIÓN: Si habla español, tiene a castañeda disposición servicios gratuitos de asistencia lingüística. Khloeame al [phone].    We comply with applicable federal civil rights laws and [place] laws. We do not discriminate on the basis of race, color, national origin, age, disability sex, sexual orientation or gender identity.            Thank you!     Thank you for choosing [facility]  for your care. Our goal is always to provide you with excellent care. Hearing back from our patients is one way we can continue to improve our services. Please take a few minutes to  complete the written survey that you may receive in the mail after your visit with us. Thank you!             Your Updated Medication List - Protect others around you: Learn how to safely use, store and throw away your medicines at www.disposemymeds.org.          This list is accurate as of: 9/5/17  9:57 AM.  Always use your most recent med list.                   Brand Name Dispense Instructions for use Diagnosis    aspirin 81 MG tablet     30 tablet    Take by mouth daily    HTN, goal below 140/90       metoprolol 25 MG 24 hr tablet    TOPROL-XL    45 tablet    Take 0.5 tablets (12.5 mg) by mouth daily    Essential hypertension with goal blood pressure less than 140/90          No Yes

## 2022-06-02 NOTE — DISCHARGE NOTE PROVIDER - HOSPITAL COURSE
patient is a 81 y/o Female with a PMHx of seizures presented to the ED BIB EMS after change in speech.  stated patient began having difficulty speaking yesterday at 7 am, but stated it was similar to prior seizures.  gave patient 1 mg ativan PO, and patient slept after this. However, after waking up at 3-330 pm yesterday her symptoms were still present, and according to  reportedly worse. He gave her other seizure medications today as well, Keppra 1000 mg AM and PM doses, without improvement in her symptoms.   also stated patient had chronic shoulder pain s/p mechanical fall last year.  reported that pt does not shake during seizures and usually presents with changes in speech.       Medical progress: More awake. Continues to be very slow when responds. Care discussed with Neurology -  no further workup at this time  Complaints: no new complaints  State of mind: very slow    Physical Exam:   GENERAL APPEARANCE:  deconditioned, frail, weak appearing  T(C): 36.3 (06-01-22 @ 19:54), Max: 36.5 (06-01-22 @ 16:10)  HR: 90 (06-02-22 @ 08:38) (85 - 90)  BP: 114/96 (06-02-22 @ 08:38) (114/96 - 125/59)  RR: 18 (06-02-22 @ 08:38) (18 - 18)  SpO2: 97% (06-02-22 @ 08:38) (97% - 99%)  Wt(kg): --  HEENT:  Head is normocephalic    Skin:  Warm and dry without any rash   NECK:  Supple without lymphadenopathy.   HEART:  Regular rate and rhythm. normal S1 and S2, No M/R/G  LUNGS:  Good ins/exp effort, no W/R/R/C  ABDOMEN:  Soft, nontender, nondistended with good bowel sounds heard  EXTREMITIES:  Without cyanosis, clubbing or edema.   NEUROLOGICAL:  very slow to respond

## 2022-06-02 NOTE — DISCHARGE NOTE PROVIDER - CARE PROVIDERS DIRECT ADDRESSES
,[email or web address]

## 2022-06-02 NOTE — PROGRESS NOTE ADULT - REASON FOR ADMISSION
Aphasia and Seizure

## 2022-06-02 NOTE — CDI QUERY NOTE - NSCDIOTHERTXTBX_GEN_ALL_CORE_HH
Altered Mental Status is a non-specific term. Can you further specify the condition to a more specific diagnosis? And is that the diagnosis that you have made to the underlying cause?     Document etiology of the altered mental status such as:     A) Metabolic encephalopathy   B) Post-ictal altered mental status   C) Delirium/acute confusion due to seizure activity   D) Other (please specify) ___________________.   E) Unable to determine     SUPPORTING DOCUMENTATION AND/OR CLINICAL EVIDENCE:    Adult-Hospitalist PN 5/29/2022  RR called for tonic-clonic seizures; ativan given now patient is somnolent, postictal     Adult-Hospitalist PN 5/30/2022  pt with AMS, non verbal    Adult-Hospitalist PN 5/31/2022  pt more awake and alert today, trying to speak full sentences repeat 24 Hr video EEG showed termination of seizure activity

## 2022-06-02 NOTE — DISCHARGE NOTE PROVIDER - NSDCCPCAREPLAN_GEN_ALL_CORE_FT
PRINCIPAL DISCHARGE DIAGNOSIS  Diagnosis: Seizure  Assessment and Plan of Treatment: - Uncontrolled Seizures: non convulsive status epilepticus  - keppra 1500 mg  po BID  - vimpat 100 mg po BID,  Vimpat 100 mg po BID -  5 days then 50 mg BId x 5 days then d/c.  - dilantin 100 mg po BID  - PT evaluation  - Neurology follow up - Dr. Delarosa (care discussed)       PRINCIPAL DISCHARGE DIAGNOSIS  Diagnosis: Seizure  Assessment and Plan of Treatment: - Delirium/acute confusion due to extensive seizure activity   - Uncontrolled Seizures: non convulsive status epilepticus  - keppra 1500 mg  po BID  - vimpat 100 mg po BID,  Vimpat 100 mg po BID -  5 days then 50 mg BId x 5 days then d/c.  - dilantin 100 mg po BID  - PT evaluation  - Neurology follow up - Dr. Delarosa (care discussed)

## 2022-06-03 ENCOUNTER — TRANSCRIPTION ENCOUNTER (OUTPATIENT)
Age: 82
End: 2022-06-03

## 2022-06-03 VITALS
SYSTOLIC BLOOD PRESSURE: 145 MMHG | RESPIRATION RATE: 16 BRPM | DIASTOLIC BLOOD PRESSURE: 72 MMHG | HEART RATE: 79 BPM | TEMPERATURE: 98 F

## 2022-06-03 LAB
ALBUMIN SERPL ELPH-MCNC: 2.6 G/DL — LOW (ref 3.3–5)
ALP SERPL-CCNC: 71 U/L — SIGNIFICANT CHANGE UP (ref 40–120)
ALT FLD-CCNC: 19 U/L — SIGNIFICANT CHANGE UP (ref 12–78)
ANION GAP SERPL CALC-SCNC: 5 MMOL/L — SIGNIFICANT CHANGE UP (ref 5–17)
AST SERPL-CCNC: 14 U/L — LOW (ref 15–37)
BILIRUB SERPL-MCNC: 0.4 MG/DL — SIGNIFICANT CHANGE UP (ref 0.2–1.2)
BUN SERPL-MCNC: 9 MG/DL — SIGNIFICANT CHANGE UP (ref 7–23)
CALCIUM SERPL-MCNC: 8.8 MG/DL — SIGNIFICANT CHANGE UP (ref 8.5–10.1)
CHLORIDE SERPL-SCNC: 107 MMOL/L — SIGNIFICANT CHANGE UP (ref 96–108)
CO2 SERPL-SCNC: 30 MMOL/L — SIGNIFICANT CHANGE UP (ref 22–31)
CREAT SERPL-MCNC: 0.52 MG/DL — SIGNIFICANT CHANGE UP (ref 0.5–1.3)
EGFR: 94 ML/MIN/1.73M2 — SIGNIFICANT CHANGE UP
GLUCOSE SERPL-MCNC: 92 MG/DL — SIGNIFICANT CHANGE UP (ref 70–99)
HCT VFR BLD CALC: 36.9 % — SIGNIFICANT CHANGE UP (ref 34.5–45)
HGB BLD-MCNC: 12.2 G/DL — SIGNIFICANT CHANGE UP (ref 11.5–15.5)
MCHC RBC-ENTMCNC: 31 PG — SIGNIFICANT CHANGE UP (ref 27–34)
MCHC RBC-ENTMCNC: 33.1 GM/DL — SIGNIFICANT CHANGE UP (ref 32–36)
MCV RBC AUTO: 93.9 FL — SIGNIFICANT CHANGE UP (ref 80–100)
PHENYTOIN FREE SERPL-MCNC: 20.5 UG/ML — HIGH (ref 10–20)
PLATELET # BLD AUTO: 214 K/UL — SIGNIFICANT CHANGE UP (ref 150–400)
POTASSIUM SERPL-MCNC: 3.1 MMOL/L — LOW (ref 3.5–5.3)
POTASSIUM SERPL-SCNC: 3.1 MMOL/L — LOW (ref 3.5–5.3)
PROT SERPL-MCNC: 5.9 GM/DL — LOW (ref 6–8.3)
RBC # BLD: 3.93 M/UL — SIGNIFICANT CHANGE UP (ref 3.8–5.2)
RBC # FLD: 13 % — SIGNIFICANT CHANGE UP (ref 10.3–14.5)
SODIUM SERPL-SCNC: 142 MMOL/L — SIGNIFICANT CHANGE UP (ref 135–145)
WBC # BLD: 7.79 K/UL — SIGNIFICANT CHANGE UP (ref 3.8–10.5)
WBC # FLD AUTO: 7.79 K/UL — SIGNIFICANT CHANGE UP (ref 3.8–10.5)

## 2022-06-03 PROCEDURE — 99239 HOSP IP/OBS DSCHRG MGMT >30: CPT

## 2022-06-03 RX ORDER — POTASSIUM CHLORIDE 20 MEQ
40 PACKET (EA) ORAL EVERY 4 HOURS
Refills: 0 | Status: COMPLETED | OUTPATIENT
Start: 2022-06-03 | End: 2022-06-03

## 2022-06-03 RX ADMIN — Medication 40 MILLIEQUIVALENT(S): at 09:21

## 2022-06-03 RX ADMIN — Medication 250 MILLIGRAM(S): at 09:21

## 2022-06-03 RX ADMIN — Medication 1 TABLET(S): at 09:21

## 2022-06-03 RX ADMIN — Medication 40 MILLIEQUIVALENT(S): at 13:30

## 2022-06-03 RX ADMIN — LACOSAMIDE 100 MILLIGRAM(S): 50 TABLET ORAL at 09:21

## 2022-06-03 RX ADMIN — Medication 100 MILLIGRAM(S): at 09:21

## 2022-06-03 RX ADMIN — LEVETIRACETAM 1500 MILLIGRAM(S): 250 TABLET, FILM COATED ORAL at 09:22

## 2022-06-03 NOTE — DISCHARGE NOTE NURSING/CASE MANAGEMENT/SOCIAL WORK - PATIENT PORTAL LINK FT
You can access the FollowMyHealth Patient Portal offered by [facility] by registering at the following website: [email or web address]. By joining [facility]’s FollowMyHealth portal, you will also be able to view your health information using other applications (apps) compatible with our system.

## 2022-06-03 NOTE — DISCHARGE NOTE NURSING/CASE MANAGEMENT/SOCIAL WORK - NSDCVIVACCINE_GEN_ALL_CORE_FT
Tdap; 02-Jun-2021 22:41; Peg Wallace (RN); [employer]; [id number] (Exp. Date: 18-Nov-2022); IntraMuscular; Deltoid Left.; 0.5 milliLiter(s); VIS (VIS Published: 09-May-2013, VIS Presented: 02-Jun-2021);

## 2022-06-05 LAB — LEVETIRACETAM SERPL-MCNC: 47.5 UG/ML — HIGH (ref 10–40)

## 2022-06-08 DIAGNOSIS — G40.401 OTHER GENERALIZED EPILEPSY AND EPILEPTIC SYNDROMES, NOT INTRACTABLE, WITH STATUS EPILEPTICUS: ICD-10-CM

## 2022-06-08 DIAGNOSIS — E78.5 HYPERLIPIDEMIA, UNSPECIFIED: ICD-10-CM

## 2022-06-08 DIAGNOSIS — F05 DELIRIUM DUE TO KNOWN PHYSIOLOGICAL CONDITION: ICD-10-CM

## 2022-06-08 DIAGNOSIS — Z96.651 PRESENCE OF RIGHT ARTIFICIAL KNEE JOINT: ICD-10-CM

## 2022-07-08 PROBLEM — Z00.00 ENCOUNTER FOR PREVENTIVE HEALTH EXAMINATION: Status: ACTIVE | Noted: 2022-07-08

## 2023-01-05 ENCOUNTER — INPATIENT (INPATIENT)
Facility: HOSPITAL | Age: 83
LOS: 2 days | Discharge: HOME HEALTH | DRG: 101 | End: 2023-01-08
Attending: HOSPITALIST | Admitting: EMERGENCY MEDICINE
Payer: MEDICARE

## 2023-01-05 VITALS
HEART RATE: 81 BPM | RESPIRATION RATE: 17 BRPM | DIASTOLIC BLOOD PRESSURE: 82 MMHG | TEMPERATURE: 98 F | OXYGEN SATURATION: 97 % | SYSTOLIC BLOOD PRESSURE: 180 MMHG

## 2023-01-05 DIAGNOSIS — R56.9 UNSPECIFIED CONVULSIONS: ICD-10-CM

## 2023-01-05 LAB
ALBUMIN SERPL ELPH-MCNC: 3.4 G/DL — SIGNIFICANT CHANGE UP (ref 3.3–5)
ALP SERPL-CCNC: 71 U/L — SIGNIFICANT CHANGE UP (ref 40–120)
ALT FLD-CCNC: 26 U/L — SIGNIFICANT CHANGE UP (ref 12–78)
ANION GAP SERPL CALC-SCNC: 7 MMOL/L — SIGNIFICANT CHANGE UP (ref 5–17)
APPEARANCE UR: CLEAR — SIGNIFICANT CHANGE UP
AST SERPL-CCNC: 26 U/L — SIGNIFICANT CHANGE UP (ref 15–37)
BASOPHILS # BLD AUTO: 0.02 K/UL — SIGNIFICANT CHANGE UP (ref 0–0.2)
BASOPHILS NFR BLD AUTO: 0.2 % — SIGNIFICANT CHANGE UP (ref 0–2)
BILIRUB SERPL-MCNC: 0.4 MG/DL — SIGNIFICANT CHANGE UP (ref 0.2–1.2)
BILIRUB UR-MCNC: NEGATIVE — SIGNIFICANT CHANGE UP
BUN SERPL-MCNC: 17 MG/DL — SIGNIFICANT CHANGE UP (ref 7–23)
CALCIUM SERPL-MCNC: 9.1 MG/DL — SIGNIFICANT CHANGE UP (ref 8.5–10.1)
CHLORIDE SERPL-SCNC: 98 MMOL/L — SIGNIFICANT CHANGE UP (ref 96–108)
CO2 SERPL-SCNC: 26 MMOL/L — SIGNIFICANT CHANGE UP (ref 22–31)
COLOR SPEC: YELLOW — SIGNIFICANT CHANGE UP
CREAT SERPL-MCNC: 1.01 MG/DL — SIGNIFICANT CHANGE UP (ref 0.5–1.3)
DIFF PNL FLD: NEGATIVE — SIGNIFICANT CHANGE UP
EGFR: 56 ML/MIN/1.73M2 — LOW
EOSINOPHIL # BLD AUTO: 0.1 K/UL — SIGNIFICANT CHANGE UP (ref 0–0.5)
EOSINOPHIL NFR BLD AUTO: 1.1 % — SIGNIFICANT CHANGE UP (ref 0–6)
FLUAV AG NPH QL: SIGNIFICANT CHANGE UP
FLUBV AG NPH QL: SIGNIFICANT CHANGE UP
GLUCOSE SERPL-MCNC: 111 MG/DL — HIGH (ref 70–99)
GLUCOSE UR QL: NEGATIVE — SIGNIFICANT CHANGE UP
HCT VFR BLD CALC: 39.2 % — SIGNIFICANT CHANGE UP (ref 34.5–45)
HGB BLD-MCNC: 13.4 G/DL — SIGNIFICANT CHANGE UP (ref 11.5–15.5)
IMM GRANULOCYTES NFR BLD AUTO: 0.7 % — SIGNIFICANT CHANGE UP (ref 0–0.9)
KETONES UR-MCNC: NEGATIVE — SIGNIFICANT CHANGE UP
LEUKOCYTE ESTERASE UR-ACNC: NEGATIVE — SIGNIFICANT CHANGE UP
LYMPHOCYTES # BLD AUTO: 1.67 K/UL — SIGNIFICANT CHANGE UP (ref 1–3.3)
LYMPHOCYTES # BLD AUTO: 17.8 % — SIGNIFICANT CHANGE UP (ref 13–44)
MAGNESIUM SERPL-MCNC: 2 MG/DL — SIGNIFICANT CHANGE UP (ref 1.6–2.6)
MCHC RBC-ENTMCNC: 32.3 PG — SIGNIFICANT CHANGE UP (ref 27–34)
MCHC RBC-ENTMCNC: 34.2 GM/DL — SIGNIFICANT CHANGE UP (ref 32–36)
MCV RBC AUTO: 94.5 FL — SIGNIFICANT CHANGE UP (ref 80–100)
MONOCYTES # BLD AUTO: 0.93 K/UL — HIGH (ref 0–0.9)
MONOCYTES NFR BLD AUTO: 9.9 % — SIGNIFICANT CHANGE UP (ref 2–14)
NEUTROPHILS # BLD AUTO: 6.61 K/UL — SIGNIFICANT CHANGE UP (ref 1.8–7.4)
NEUTROPHILS NFR BLD AUTO: 70.3 % — SIGNIFICANT CHANGE UP (ref 43–77)
NITRITE UR-MCNC: NEGATIVE — SIGNIFICANT CHANGE UP
PH UR: 5 — SIGNIFICANT CHANGE UP (ref 5–8)
PHENYTOIN FREE SERPL-MCNC: <0.4 UG/ML — LOW (ref 10–20)
PHOSPHATE SERPL-MCNC: 3.1 MG/DL — SIGNIFICANT CHANGE UP (ref 2.5–4.5)
PLATELET # BLD AUTO: 239 K/UL — SIGNIFICANT CHANGE UP (ref 150–400)
POTASSIUM SERPL-MCNC: 4.3 MMOL/L — SIGNIFICANT CHANGE UP (ref 3.5–5.3)
POTASSIUM SERPL-SCNC: 4.3 MMOL/L — SIGNIFICANT CHANGE UP (ref 3.5–5.3)
PROT SERPL-MCNC: 6.7 GM/DL — SIGNIFICANT CHANGE UP (ref 6–8.3)
PROT UR-MCNC: NEGATIVE — SIGNIFICANT CHANGE UP
RBC # BLD: 4.15 M/UL — SIGNIFICANT CHANGE UP (ref 3.8–5.2)
RBC # FLD: 12.7 % — SIGNIFICANT CHANGE UP (ref 10.3–14.5)
RSV RNA NPH QL NAA+NON-PROBE: SIGNIFICANT CHANGE UP
SARS-COV-2 RNA SPEC QL NAA+PROBE: SIGNIFICANT CHANGE UP
SODIUM SERPL-SCNC: 131 MMOL/L — LOW (ref 135–145)
SP GR SPEC: 1.01 — SIGNIFICANT CHANGE UP (ref 1.01–1.02)
UROBILINOGEN FLD QL: NEGATIVE — SIGNIFICANT CHANGE UP
WBC # BLD: 9.4 K/UL — SIGNIFICANT CHANGE UP (ref 3.8–10.5)
WBC # FLD AUTO: 9.4 K/UL — SIGNIFICANT CHANGE UP (ref 3.8–10.5)

## 2023-01-05 PROCEDURE — 82140 ASSAY OF AMMONIA: CPT

## 2023-01-05 PROCEDURE — 95816 EEG AWAKE AND DROWSY: CPT

## 2023-01-05 PROCEDURE — 70450 CT HEAD/BRAIN W/O DYE: CPT | Mod: 26,MA

## 2023-01-05 PROCEDURE — 93005 ELECTROCARDIOGRAM TRACING: CPT

## 2023-01-05 PROCEDURE — 93010 ELECTROCARDIOGRAM REPORT: CPT

## 2023-01-05 PROCEDURE — 36415 COLL VENOUS BLD VENIPUNCTURE: CPT

## 2023-01-05 PROCEDURE — 99291 CRITICAL CARE FIRST HOUR: CPT

## 2023-01-05 PROCEDURE — 95714 VEEG EA 12-26 HR UNMNTR: CPT

## 2023-01-05 PROCEDURE — 80048 BASIC METABOLIC PNL TOTAL CA: CPT

## 2023-01-05 PROCEDURE — 82962 GLUCOSE BLOOD TEST: CPT

## 2023-01-05 PROCEDURE — 95700 EEG CONT REC W/VID EEG TECH: CPT

## 2023-01-05 PROCEDURE — 71045 X-RAY EXAM CHEST 1 VIEW: CPT | Mod: 26

## 2023-01-05 PROCEDURE — 85025 COMPLETE CBC W/AUTO DIFF WBC: CPT

## 2023-01-05 PROCEDURE — 83036 HEMOGLOBIN GLYCOSYLATED A1C: CPT

## 2023-01-05 RX ORDER — ONDANSETRON 8 MG/1
4 TABLET, FILM COATED ORAL EVERY 8 HOURS
Refills: 0 | Status: DISCONTINUED | OUTPATIENT
Start: 2023-01-05 | End: 2023-01-08

## 2023-01-05 RX ORDER — LANOLIN ALCOHOL/MO/W.PET/CERES
3 CREAM (GRAM) TOPICAL AT BEDTIME
Refills: 0 | Status: DISCONTINUED | OUTPATIENT
Start: 2023-01-05 | End: 2023-01-08

## 2023-01-05 RX ORDER — ACETAMINOPHEN 500 MG
650 TABLET ORAL EVERY 6 HOURS
Refills: 0 | Status: DISCONTINUED | OUTPATIENT
Start: 2023-01-05 | End: 2023-01-08

## 2023-01-05 RX ORDER — LEVETIRACETAM 250 MG/1
1500 TABLET, FILM COATED ORAL ONCE
Refills: 0 | Status: COMPLETED | OUTPATIENT
Start: 2023-01-05 | End: 2023-01-05

## 2023-01-05 RX ADMIN — LEVETIRACETAM 400 MILLIGRAM(S): 250 TABLET, FILM COATED ORAL at 21:23

## 2023-01-05 RX ADMIN — Medication 2 MILLIGRAM(S): at 20:40

## 2023-01-05 NOTE — ED PROVIDER NOTE - CLINICAL SUMMARY MEDICAL DECISION MAKING FREE TEXT BOX
83 y/o female presents with seizure. Pt has been taking her meds as usual. Concern for possible status epilepticus. Pt given ativan and IV keppra. Plan check labs, CT scan. 81 y/o female presents with seizure.  Pt has been taking her meds as usual. Concern for possible status epilepticus. Pt given ativan and IV keppra. Plan check labs, CT scan.

## 2023-01-05 NOTE — ED PROVIDER NOTE - SEIZURE DESCRIPTION
----- Message from Ese Yi sent at 4/28/2022 11:52 AM CDT -----  Regarding: mini fit pro wax  Pt wife called and stated that he needs 2 packs of the Mini fit pro wax. He will  when they are ready.     shaking, generalized

## 2023-01-05 NOTE — ED ADULT NURSE NOTE - NSIMPLEMENTINTERV_GEN_ALL_ED
Implemented All Fall Risk Interventions:  [place] to call system. Call bell, personal items and telephone within reach. Instruct patient to call for assistance. Room bathroom lighting operational. Non-slip footwear when patient is off stretcher. Physically safe environment: no spills, clutter or unnecessary equipment. Stretcher in lowest position, wheels locked, appropriate side rails in place. Provide visual cue, wrist band, yellow gown, etc. Monitor gait and stability. Monitor for mental status changes and reorient to person, place, and time. Review medications for side effects contributing to fall risk. Reinforce activity limits and safety measures with patient and family.

## 2023-01-05 NOTE — ED ADULT NURSE NOTE - OBJECTIVE STATEMENT
Pt BIBEMS following 2 seizures at home today. Pt had 3rd tonic clonic seizure while being loaded onto stretcher by EMS. Pt's  states Pt has had decline in mental status since 1/2. Pt was seen today at neurologist for MRI. Pt's  at bedside at this time. Pt comfort and safety maintained. Seizure precautions in place at this time.

## 2023-01-05 NOTE — ED ADULT TRIAGE NOTE - CHIEF COMPLAINT QUOTE
pt bibems from home s/p 2 seizures today. as per ems patient had third tonic seizure as patient was moved onto stretcher. as per ems and  pt has been having AMS since 1/2. no meds given in route to ED. hx seizures. pt had MRI and appt with Neurologist today.  at bedside. airway patient at this time.

## 2023-01-05 NOTE — ED ADULT NURSE REASSESSMENT NOTE - NS ED NURSE REASSESS COMMENT FT1
Pt received from KIMBERLY Maciel, pt resting in bed comfortably, pt responds to verbal able to state name, and follow commands. VSS

## 2023-01-05 NOTE — ED PROVIDER NOTE - PROGRESS NOTE DETAILS
Sammie Laurent:  updated on results for tests. Pt is sleeping with no active seizure at this time. Plan for admission. Sammie Laurent: Discussed with Dr. Spicer for admission.

## 2023-01-05 NOTE — ED PROVIDER NOTE - OBJECTIVE STATEMENT
83 y/o female with PMHx of seizure disorder on vimpat and keppra BIBEMS to ED with  s/p multiple seizures today. pt had MRI this evening at 4 P.M, after returning from the MRI pt had 2 seizures at home. En route per EMS, pt had third seizure.   Upon initial evaluation, pt had 4th seizure. Tonic clonic movement, pt staring to the left. Per , pt has been taking medications as usual. No nausea or vomiting.

## 2023-01-05 NOTE — ED ADULT TRIAGE NOTE - PATIENT ON (OXYGEN DELIVERY METHOD)
Vanco Trough - 22.4. Will hold Vanco. Repeat trough ordered for 2/8 @ 1900.  If trough 
result is less than 20, will re-start Vanco @ 1250mg every 12 hours @ 2100.  If trough 
result remains at 20 or above, will continue to hold vancomycin. room air

## 2023-01-06 ENCOUNTER — TRANSCRIPTION ENCOUNTER (OUTPATIENT)
Age: 83
End: 2023-01-06

## 2023-01-06 LAB
A1C WITH ESTIMATED AVERAGE GLUCOSE RESULT: 5.3 % — SIGNIFICANT CHANGE UP (ref 4–5.6)
ADD ON TEST-SPECIMEN IN LAB: SIGNIFICANT CHANGE UP
ANION GAP SERPL CALC-SCNC: 6 MMOL/L — SIGNIFICANT CHANGE UP (ref 5–17)
BASOPHILS # BLD AUTO: 0.03 K/UL — SIGNIFICANT CHANGE UP (ref 0–0.2)
BASOPHILS NFR BLD AUTO: 0.3 % — SIGNIFICANT CHANGE UP (ref 0–2)
BUN SERPL-MCNC: 12 MG/DL — SIGNIFICANT CHANGE UP (ref 7–23)
CALCIUM SERPL-MCNC: 8.8 MG/DL — SIGNIFICANT CHANGE UP (ref 8.5–10.1)
CHLORIDE SERPL-SCNC: 98 MMOL/L — SIGNIFICANT CHANGE UP (ref 96–108)
CO2 SERPL-SCNC: 29 MMOL/L — SIGNIFICANT CHANGE UP (ref 22–31)
CREAT SERPL-MCNC: 0.72 MG/DL — SIGNIFICANT CHANGE UP (ref 0.5–1.3)
EGFR: 84 ML/MIN/1.73M2 — SIGNIFICANT CHANGE UP
EOSINOPHIL # BLD AUTO: 0.06 K/UL — SIGNIFICANT CHANGE UP (ref 0–0.5)
EOSINOPHIL NFR BLD AUTO: 0.7 % — SIGNIFICANT CHANGE UP (ref 0–6)
ESTIMATED AVERAGE GLUCOSE: 105 MG/DL — SIGNIFICANT CHANGE UP (ref 68–114)
GLUCOSE SERPL-MCNC: 119 MG/DL — HIGH (ref 70–99)
HCT VFR BLD CALC: 37.5 % — SIGNIFICANT CHANGE UP (ref 34.5–45)
HGB BLD-MCNC: 12.6 G/DL — SIGNIFICANT CHANGE UP (ref 11.5–15.5)
IMM GRANULOCYTES NFR BLD AUTO: 0.7 % — SIGNIFICANT CHANGE UP (ref 0–0.9)
LYMPHOCYTES # BLD AUTO: 1.05 K/UL — SIGNIFICANT CHANGE UP (ref 1–3.3)
LYMPHOCYTES # BLD AUTO: 12.1 % — LOW (ref 13–44)
MCHC RBC-ENTMCNC: 31.7 PG — SIGNIFICANT CHANGE UP (ref 27–34)
MCHC RBC-ENTMCNC: 33.6 GM/DL — SIGNIFICANT CHANGE UP (ref 32–36)
MCV RBC AUTO: 94.5 FL — SIGNIFICANT CHANGE UP (ref 80–100)
MONOCYTES # BLD AUTO: 0.89 K/UL — SIGNIFICANT CHANGE UP (ref 0–0.9)
MONOCYTES NFR BLD AUTO: 10.2 % — SIGNIFICANT CHANGE UP (ref 2–14)
NEUTROPHILS # BLD AUTO: 6.61 K/UL — SIGNIFICANT CHANGE UP (ref 1.8–7.4)
NEUTROPHILS NFR BLD AUTO: 76 % — SIGNIFICANT CHANGE UP (ref 43–77)
PLATELET # BLD AUTO: 226 K/UL — SIGNIFICANT CHANGE UP (ref 150–400)
POTASSIUM SERPL-MCNC: 3.8 MMOL/L — SIGNIFICANT CHANGE UP (ref 3.5–5.3)
POTASSIUM SERPL-SCNC: 3.8 MMOL/L — SIGNIFICANT CHANGE UP (ref 3.5–5.3)
RBC # BLD: 3.97 M/UL — SIGNIFICANT CHANGE UP (ref 3.8–5.2)
RBC # FLD: 12.5 % — SIGNIFICANT CHANGE UP (ref 10.3–14.5)
SODIUM SERPL-SCNC: 133 MMOL/L — LOW (ref 135–145)
WBC # BLD: 8.7 K/UL — SIGNIFICANT CHANGE UP (ref 3.8–10.5)
WBC # FLD AUTO: 8.7 K/UL — SIGNIFICANT CHANGE UP (ref 3.8–10.5)

## 2023-01-06 PROCEDURE — 93010 ELECTROCARDIOGRAM REPORT: CPT

## 2023-01-06 PROCEDURE — 99223 1ST HOSP IP/OBS HIGH 75: CPT

## 2023-01-06 PROCEDURE — 12345: CPT | Mod: NC

## 2023-01-06 PROCEDURE — 99222 1ST HOSP IP/OBS MODERATE 55: CPT

## 2023-01-06 PROCEDURE — 95816 EEG AWAKE AND DROWSY: CPT | Mod: 26

## 2023-01-06 RX ORDER — LEVETIRACETAM 250 MG/1
1.5 TABLET, FILM COATED ORAL
Qty: 0 | Refills: 0 | DISCHARGE

## 2023-01-06 RX ORDER — LAMOTRIGINE 25 MG/1
150 TABLET, ORALLY DISINTEGRATING ORAL EVERY 12 HOURS
Refills: 0 | Status: DISCONTINUED | OUTPATIENT
Start: 2023-01-06 | End: 2023-01-08

## 2023-01-06 RX ORDER — LEVETIRACETAM 250 MG/1
1500 TABLET, FILM COATED ORAL
Refills: 0 | Status: DISCONTINUED | OUTPATIENT
Start: 2023-01-06 | End: 2023-01-08

## 2023-01-06 RX ORDER — GALANTAMINE HYDROBROMIDE 4 MG/1
1 TABLET, FILM COATED ORAL
Qty: 0 | Refills: 0 | DISCHARGE

## 2023-01-06 RX ORDER — ESCITALOPRAM OXALATE 10 MG/1
10 TABLET, FILM COATED ORAL AT BEDTIME
Refills: 0 | Status: DISCONTINUED | OUTPATIENT
Start: 2023-01-06 | End: 2023-01-08

## 2023-01-06 RX ORDER — DIVALPROEX SODIUM 500 MG/1
250 TABLET, DELAYED RELEASE ORAL EVERY 12 HOURS
Refills: 0 | Status: DISCONTINUED | OUTPATIENT
Start: 2023-01-06 | End: 2023-01-08

## 2023-01-06 RX ORDER — ESCITALOPRAM OXALATE 10 MG/1
1 TABLET, FILM COATED ORAL
Qty: 0 | Refills: 0 | DISCHARGE

## 2023-01-06 RX ADMIN — Medication 1 TABLET(S): at 11:59

## 2023-01-06 RX ADMIN — DIVALPROEX SODIUM 250 MILLIGRAM(S): 500 TABLET, DELAYED RELEASE ORAL at 22:05

## 2023-01-06 RX ADMIN — DIVALPROEX SODIUM 250 MILLIGRAM(S): 500 TABLET, DELAYED RELEASE ORAL at 11:59

## 2023-01-06 RX ADMIN — LEVETIRACETAM 1500 MILLIGRAM(S): 250 TABLET, FILM COATED ORAL at 10:21

## 2023-01-06 RX ADMIN — LAMOTRIGINE 150 MILLIGRAM(S): 25 TABLET, ORALLY DISINTEGRATING ORAL at 10:20

## 2023-01-06 RX ADMIN — ESCITALOPRAM OXALATE 10 MILLIGRAM(S): 10 TABLET, FILM COATED ORAL at 22:06

## 2023-01-06 RX ADMIN — LAMOTRIGINE 150 MILLIGRAM(S): 25 TABLET, ORALLY DISINTEGRATING ORAL at 22:05

## 2023-01-06 RX ADMIN — LEVETIRACETAM 1500 MILLIGRAM(S): 250 TABLET, FILM COATED ORAL at 22:05

## 2023-01-06 RX ADMIN — Medication 650 MILLIGRAM(S): at 22:06

## 2023-01-06 RX ADMIN — Medication 3 MILLIGRAM(S): at 22:06

## 2023-01-06 NOTE — CONSULT NOTE ADULT - ASSESSMENT
83 y/o F with PMHx seizure disorder, status epilepticus, HLD, Anxiety, BIBEMS for multiple seizures. As per her , patient went for an MRI today at ~ 4pm and when she returned suffered 2 seizures at home. EMS was called and en route patient suffered another seizure, and had a 4th episode in ER on arrival. Seizures described as tonic-clonic. As per  patient has been compliant with her medications. Patient was given ativan 2mg IV x1 and Keppra 1500mg x 1 in ER.    She has had previous admissions during which time she was in status epilepticus.  Her outpatient neurologist reports that she is sometimes encephalopathic.    At this time routine EEG did not show evidence of status epilepticus.  However, she is confused. Unclear if this is her baseline or if related to effects of increased medications.    Prior EEGs were read as generalized spike and wave.  -Will add low dose Depakote 250 mg q12  -LFTs were normal.  -Ammonia level will be checked as a precaution.    -Continue Keppra 1500 mg bid and Lamotrigine 150 mg BID.  -If she does well with Depakote, may be able to decrease dose of Keppra in the future.    -Will get prolonged EEG if another monitor is available.    Discussed with Dr. Griffin.  Dr. Lopez will take over neurology coverage on 1/7 and f/u as needed.

## 2023-01-06 NOTE — PATIENT PROFILE ADULT - NSPRESCRALCFREQ_GEN_A_NUR
Immunization chart prep completed.  Immunization records verified.  Jessenia Woody due for All Vacinations Up To Date No Vaccinations Needed   2-3 times a week

## 2023-01-06 NOTE — DISCHARGE NOTE NURSING/CASE MANAGEMENT/SOCIAL WORK - NSDCVIVACCINE_GEN_ALL_CORE_FT
Tdap; 02-Jun-2021 22:41; Peg Wallace (RN); [employer]; [id number] (Exp. Date: 18-Nov-2022); IntraMuscular; Deltoid Left.; 0.5 milliLiter(s); VIS (VIS Published: 09-May-2013, VIS Presented: 02-Jun-2021);

## 2023-01-06 NOTE — H&P ADULT - HISTORY OF PRESENT ILLNESS
81 y/o F with PMHx seizure disorder, HLD, Anxiety, BIBEMS for multiple seizures. As per her , patient went for an MRI today at ~ 4pm and when she returned suffered 2 seizures at home. EMS was called and en route patient suffered another seizure, and had a 4th episode in ER on arrival. Seizures described as tonic-clonic. As per  patient has been compliant with her medications. Patient was given ativan 2mg IV x1 and Keppra 1500mg x 1 in ER. On my evaluation, patient more awake now as per RN, follows commands, somewhat drowsy, oriented to self and place.  83 y/o F with PMHx seizure disorder, HLD, Anxiety, BIBEMS for multiple seizures. As per her , patient went for an MRI today at ~ 4pm and when she returned suffered 2 seizures at home. EMS was called and en route patient suffered another seizure, and had a 4th episode in ER on arrival. Seizures described as tonic-clonic. As per  patient has been compliant with her medications. Patient was given ativan 2mg IV x1 and Keppra 1500mg x 1 in ER. CT head unremarkable. On my evaluation, patient more awake now as per RN, follows commands, somewhat drowsy, oriented to self and place.  81 y/o F with PMHx seizure disorder, status epilepticus, HLD, Anxiety, BIBEMS for multiple seizures. As per her , patient went for an MRI today at ~ 4pm and when she returned suffered 2 seizures at home. EMS was called and en route patient suffered another seizure, and had a 4th episode in ER on arrival. Seizures described as tonic-clonic. As per  patient has been compliant with her medications. Patient was given ativan 2mg IV x1 and Keppra 1500mg x 1 in ER. CT head unremarkable. On my evaluation, patient more awake now as per RN, follows commands, somewhat drowsy, oriented to self and place.

## 2023-01-06 NOTE — CONSULT NOTE ADULT - SUBJECTIVE AND OBJECTIVE BOX
Patient is a 82y old  Female who presents with a chief complaint of Seizures (06 Jan 2023 00:34)      HPI:  83 y/o F with PMHx seizure disorder, status epilepticus, HLD, Anxiety, BIBEMS for multiple seizures. As per her , patient went for an MRI today at ~ 4pm and when she returned suffered 2 seizures at home. EMS was called and en route patient suffered another seizure, and had a 4th episode in ER on arrival. Seizures described as tonic-clonic. As per  patient has been compliant with her medications. Patient was given ativan 2mg IV x1 and Keppra 1500mg x 1 in ER.    I spoke with her , Parrish and her daughter. She follows with Dr. Barrett and was due to see Dr. Jones at [facility] next week.  He has noticed a change in her over several days. He gave her lorazepam three out of four days because she was off - not talking, not recognizing people. Typically if he gives ativan she will sleep and then wake up fine.     Keppra was recently changed to extended release - 1500 mg BID  Lamotrigine was also recently changed to extended release - 150 mg BID. She had been on 150 mg in the AM and 200 mg at night but was told to decrease the dose.    She saw Dr. Barrett yesterday at 3 PM and went for an MRI at about 4 PM.  He says that she has not missed any recent medication doses.   He states that on the way home from the MRI she made a funny noise in the car for a few seconds associated with shaking.  They got home and her body was convulsing.  She was also shaking in the ambulance.   She had another event in the ED.    Family reports that during a previous hospitalization she was on Vimpat which helped.     PAST MEDICAL & SURGICAL HISTORY:  Seizure disorder  Hyperlipidemia  Anxiety disorder  Total knee replacement status  right          FAMILY HISTORY:      Social Hx:  Nonsmoker, no drug or alcohol use    MEDICATIONS  (STANDING):  lamoTRIgine 150 milliGRAM(s) Oral every 12 hours  levETIRAcetam 1500 milliGRAM(s) Oral two times a day       Allergies    No Known Allergies    Intolerances        ROS: Pertinent positives in HPI, all other ROS were reviewed and are negative.      Vital Signs Last 24 Hrs  T(C): 36.3 (06 Jan 2023 09:09), Max: 37 (05 Jan 2023 22:45)  T(F): 97.4 (06 Jan 2023 09:09), Max: 98.6 (05 Jan 2023 22:45)  HR: 61 (06 Jan 2023 09:09) (61 - 90)  BP: 133/55 (06 Jan 2023 09:09) (127/95 - 180/82)  BP(mean): 99 (05 Jan 2023 22:45) (87 - 104)  RR: 19 (06 Jan 2023 09:09) (16 - 20)  SpO2: 97% (06 Jan 2023 09:09) (97% - 100%)    Parameters below as of 06 Jan 2023 09:09  Patient On (Oxygen Delivery Method): nasal cannula  O2 Flow (L/min): 2          Constitutional: awake and alert.  HEENT: PERRLA, EOMI,   Neck: Supple.  Respiratory: Breath sounds are clear bilaterally  Cardiovascular: S1 and S2, regular / irregular rhythm  Gastrointestinal: soft, nontender  Extremities:  no edema  Vascular: Caritid Bruit - no  Musculoskeletal: no joint swelling/tenderness, no abnormal movements  Skin: No rashes    Neurological exam:  HF: A x O x 3. Appropriately interactive, normal affect. Speech fluent, No Aphasia or paraphasic errors. Naming /repetition intact   CN: NINO, EOMI, VFF, facial sensation normal, no NLFD, tongue midline, Palate moves equally, SCM equal bilaterally  Motor: No pronator drift, Strength 5/5 in all 4 ext, normal bulk and tone, no tremor, rigidity or bradykinesia.    Sens: Intact to light touch / PP/ VS/ JS    Reflexes: Symmetric and normal . BJ 2+, BR 2+, KJ 2+, AJ 2+, downgoing toes b/l  Coord:  No FNFA, dysmetria, ASHUTOSH intact   Gait/Balance: Normal/Cannot test    NIHSS:          Labs:   01-06    133<L>  |  98  |  12  ----------------------------<  119<H>  3.8   |  29  |  0.72    Ca    8.8      06 Jan 2023 07:42  Phos  3.1     01-05  Mg     2.0     01-05    TPro  6.7  /  Alb  3.4  /  TBili  0.4  /  DBili  x   /  AST  26  /  ALT  26  /  AlkPhos  71  01-05                              12.6   8.70  )-----------( 226      ( 06 Jan 2023 07:42 )             37.5       Radiology:  CT head 1/5/23:  Stable exam. No acute intracranial hemorrhage, vasogenic edema,   extra-axial collection or hydrocephalus. Chronic microvascular changes as   above.        No IV tpa given because…    -ASA/PLAVIX  -Atorvastatin  -DVT prophylaxis  -Dysphagia screen  -Speech and swallow eval  -PT eval/ rehab eval    Mangement d/w Pt / family /     Total Critical Care Time spent:   Patient is a 82y old  Female who presents with a chief complaint of Seizures (06 Jan 2023 00:34)      HPI:  83 y/o F with PMHx seizure disorder, status epilepticus, HLD, Anxiety, BIBEMS for multiple seizures. As per her , patient went for an MRI today at ~ 4pm and when she returned suffered 2 seizures at home. EMS was called and en route patient suffered another seizure, and had a 4th episode in ER on arrival. Seizures described as tonic-clonic. As per  patient has been compliant with her medications. Patient was given ativan 2mg IV x1 and Keppra 1500mg x 1 in ER.    I spoke with her , Parrish and her daughter. She follows with Dr. Barrett and was due to see Dr. Jones at [facility] next week.  He has noticed a change in her over several days. He gave her lorazepam three out of four days because she was off - not talking, not recognizing people. Typically if he gives ativan she will sleep and then wake up fine.     Keppra was recently changed to extended release - 1500 mg BID  Lamotrigine was also recently changed to extended release - 150 mg BID. She had been on 150 mg in the AM and 200 mg at night but was told to decrease the dose.    She saw Dr. Barrett yesterday at 3 PM and went for an MRI at about 4 PM.  He says that she has not missed any recent medication doses.   He states that on the way home from the MRI she made a funny noise in the car for a few seconds associated with shaking.  They got home and her body was convulsing.  She was also shaking in the ambulance.   She had another event in the ED.    Family reports that during a previous hospitalization she was on Vimpat which helped.     I spoke to Dr. Barrett. He reports that she has a delicate balance between the balance of her nonconvulsive status epilepticus and side effects of anti-seizure medications.  He saw her yesterday and proposed that she be admitted to the hospital for EEG monitoring. This was planned to be done next week.      PAST MEDICAL & SURGICAL HISTORY:  Seizure disorder  Hyperlipidemia  Anxiety disorder  Total knee replacement status  right          FAMILY HISTORY:  non-contributory      Social Hx:  Nonsmoker, no drug or alcohol use    MEDICATIONS  (STANDING):  lamoTRIgine 150 milliGRAM(s) Oral every 12 hours  levETIRAcetam 1500 milliGRAM(s) Oral two times a day       Allergies    No Known Allergies    Intolerances        ROS: Pertinent positives in HPI, all other ROS were reviewed and are negative.      Vital Signs Last 24 Hrs  T(C): 36.3 (06 Jan 2023 09:09), Max: 37 (05 Jan 2023 22:45)  T(F): 97.4 (06 Jan 2023 09:09), Max: 98.6 (05 Jan 2023 22:45)  HR: 61 (06 Jan 2023 09:09) (61 - 90)  BP: 133/55 (06 Jan 2023 09:09) (127/95 - 180/82)  BP(mean): 99 (05 Jan 2023 22:45) (87 - 104)  RR: 19 (06 Jan 2023 09:09) (16 - 20)  SpO2: 97% (06 Jan 2023 09:09) (97% - 100%)    Parameters below as of 06 Jan 2023 09:09  Patient On (Oxygen Delivery Method): nasal cannula  O2 Flow (L/min): 2          Constitutional: awake and alert.  HEENT: PERRLA, EOMI,   Neck: Supple.  Respiratory: Breath sounds are clear bilaterally  Cardiovascular: S1 and S2, regular / irregular rhythm  Gastrointestinal: soft, nontender  Extremities:  no edema  Musculoskeletal: no joint swelling/tenderness, no abnormal movements  Skin: No rashes    Neurological exam:  HF: Awake and alert. Oriented to person. Not oriented to place or month. Able to state year.     CN: NINO, EOMI, VFF, facial sensation normal, no NLFD, tongue midline, Palate moves equally, SCM equal bilaterally  Motor: No pronator drift, Strength 5/5 in all 4 ext, normal bulk and tone, no tremor, rigidity or bradykinesia.    Sens: Intact to light touch   Reflexes: Symmetric and normal . BJ 2+, BR 2+, KJ 2+, downgoing toes b/l  Coord:  No FNFA, dysmetria, ASHUTOSH intact   Gait/Balance: Normal/Cannot test          Labs:   01-06    133<L>  |  98  |  12  ----------------------------<  119<H>  3.8   |  29  |  0.72    Ca    8.8      06 Jan 2023 07:42  Phos  3.1     01-05  Mg     2.0     01-05    TPro  6.7  /  Alb  3.4  /  TBili  0.4  /  DBili  x   /  AST  26  /  ALT  26  /  AlkPhos  71  01-05                              12.6   8.70  )-----------( 226      ( 06 Jan 2023 07:42 )             37.5       Radiology:  CT head 1/5/23:  Stable exam. No acute intracranial hemorrhage, vasogenic edema,   extra-axial collection or hydrocephalus. Chronic microvascular changes as   above.    EEG 1/5/23: A single burst of generalized spike and wave  Mild generalized slowing

## 2023-01-06 NOTE — DISCHARGE NOTE NURSING/CASE MANAGEMENT/SOCIAL WORK - PATIENT PORTAL LINK FT
You can access the FollowMyHealth Patient Portal offered by [facility] by registering at the following website: [email or web address]. By joining [facility]’s FollowMyHealth portal, you will also be able to view your health information using other applications (apps) compatible with our system.

## 2023-01-06 NOTE — PATIENT PROFILE ADULT - FALL HARM RISK - HARM RISK INTERVENTIONS
Assistance with ambulation/Assistance OOB with selected safe patient handling equipment/Communicate Risk of Fall with Harm to all staff/Discuss with provider need for PT consult/Monitor gait and stability/Reinforce activity limits and safety measures with patient and family/Tailored Fall Risk Interventions/Visual Cue: Yellow wristband and red socks/Bed in lowest position, wheels locked, appropriate side rails in place/Call bell, personal items and telephone in reach/Instruct patient to call for assistance before getting out of bed or chair/Non-slip footwear when patient is out of bed/[place] to call system/Physically safe environment - no spills, clutter or unnecessary equipment/Purposeful Proactive Rounding/Room/bathroom lighting operational, light cord in reach

## 2023-01-06 NOTE — H&P ADULT - ASSESSMENT
81 y/o F with PMHx seizure disorder, HLD, Anxiety, admitted for multiple seizures.     #Seizure disorder  -Admit to tele   -CT head without acute pathology   -Admission 4/2022 for status epilepticus   -S/p 4 tonic clonic seizures today   -S/p Keppra 1500mg IV and ativan 2mg IV x 1 in ER  -Patient has been complaint with medication   -Noted on PCP visit on HIE on 12/21/22, pt had absence seizures in 11/22, and her meds were increased, noting lamotrigine 150mg BID    -As per med rec with pharmacist pt filled: Keppra 1500mg BID, lamotrigine ER 125mg BID 12/12/22  -Will continue keppra 1500mg BID and lamotrigine 150mg BID here, as pt continued to have seizures  -Ativan IV PRN   -Seizure/fall and aspiration precautions   -F/u neurology consult   -EEG     #Cognitive impairment   -On Galantamine 4mg BID     #VTE ppx  -SCDs

## 2023-01-06 NOTE — H&P ADULT - NSHPPHYSICALEXAM_GEN_ALL_CORE
Vital Signs Last 24 Hrs  T(C): 37 (05 Jan 2023 22:45), Max: 37 (05 Jan 2023 22:45)  T(F): 98.6 (05 Jan 2023 22:45), Max: 98.6 (05 Jan 2023 22:45)  HR: 69 (05 Jan 2023 22:45) (69 - 81)  BP: 145/80 (05 Jan 2023 22:45) (138/68 - 180/82)  BP(mean): 99 (05 Jan 2023 22:45) (87 - 104)  RR: 20 (05 Jan 2023 22:45) (16 - 20)  SpO2: 100% (05 Jan 2023 22:45) (97% - 100%)    Parameters below as of 05 Jan 2023 22:45  Patient On (Oxygen Delivery Method): nasal cannula  O2 Flow (L/min): 2    PHYSICAL EXAM:  GENERAL: NAD, lying in bed comfortably  HEAD:  Atraumatic, Normocephalic  EYES: PERRLA, conjunctiva and sclera clear  ENT: Moist mucous membranes  NECK: Supple, No JVD  CHEST/LUNG: Clear to auscultation bilaterally; No rales, rhonchi, wheezing, or rubs. Unlabored respirations  HEART: Regular rate and rhythm; No murmurs, rubs, or gallops  ABDOMEN: Bowel sounds present; Soft, Nontender, Nondistended.   EXTREMITIES:  2+ Peripheral Pulses, brisk capillary refill. No clubbing, cyanosis, or edema  NERVOUS SYSTEM:  Alert & Oriented X2, speech clear. No deficits   MSK: FROM all 4 extremities, full and equal strength

## 2023-01-07 ENCOUNTER — TRANSCRIPTION ENCOUNTER (OUTPATIENT)
Age: 83
End: 2023-01-07

## 2023-01-07 LAB
CULTURE RESULTS: NO GROWTH — SIGNIFICANT CHANGE UP
SPECIMEN SOURCE: SIGNIFICANT CHANGE UP

## 2023-01-07 PROCEDURE — 99233 SBSQ HOSP IP/OBS HIGH 50: CPT

## 2023-01-07 PROCEDURE — 95720 EEG PHY/QHP EA INCR W/VEEG: CPT

## 2023-01-07 PROCEDURE — 99232 SBSQ HOSP IP/OBS MODERATE 35: CPT

## 2023-01-07 RX ORDER — DEXTROSE 50 % IN WATER 50 %
25 SYRINGE (ML) INTRAVENOUS ONCE
Refills: 0 | Status: DISCONTINUED | OUTPATIENT
Start: 2023-01-07 | End: 2023-01-07

## 2023-01-07 RX ORDER — INSULIN LISPRO 100/ML
VIAL (ML) SUBCUTANEOUS AT BEDTIME
Refills: 0 | Status: DISCONTINUED | OUTPATIENT
Start: 2023-01-07 | End: 2023-01-07

## 2023-01-07 RX ORDER — SODIUM CHLORIDE 9 MG/ML
1000 INJECTION, SOLUTION INTRAVENOUS
Refills: 0 | Status: DISCONTINUED | OUTPATIENT
Start: 2023-01-07 | End: 2023-01-07

## 2023-01-07 RX ORDER — DEXTROSE 50 % IN WATER 50 %
12.5 SYRINGE (ML) INTRAVENOUS ONCE
Refills: 0 | Status: DISCONTINUED | OUTPATIENT
Start: 2023-01-07 | End: 2023-01-07

## 2023-01-07 RX ORDER — GLUCAGON INJECTION, SOLUTION 0.5 MG/.1ML
1 INJECTION, SOLUTION SUBCUTANEOUS ONCE
Refills: 0 | Status: DISCONTINUED | OUTPATIENT
Start: 2023-01-07 | End: 2023-01-07

## 2023-01-07 RX ORDER — DEXTROSE 50 % IN WATER 50 %
15 SYRINGE (ML) INTRAVENOUS ONCE
Refills: 0 | Status: DISCONTINUED | OUTPATIENT
Start: 2023-01-07 | End: 2023-01-07

## 2023-01-07 RX ORDER — INSULIN LISPRO 100/ML
VIAL (ML) SUBCUTANEOUS
Refills: 0 | Status: DISCONTINUED | OUTPATIENT
Start: 2023-01-07 | End: 2023-01-07

## 2023-01-07 RX ADMIN — LEVETIRACETAM 1500 MILLIGRAM(S): 250 TABLET, FILM COATED ORAL at 11:23

## 2023-01-07 RX ADMIN — ESCITALOPRAM OXALATE 10 MILLIGRAM(S): 10 TABLET, FILM COATED ORAL at 20:25

## 2023-01-07 RX ADMIN — LEVETIRACETAM 1500 MILLIGRAM(S): 250 TABLET, FILM COATED ORAL at 20:25

## 2023-01-07 RX ADMIN — LAMOTRIGINE 150 MILLIGRAM(S): 25 TABLET, ORALLY DISINTEGRATING ORAL at 11:22

## 2023-01-07 RX ADMIN — LAMOTRIGINE 150 MILLIGRAM(S): 25 TABLET, ORALLY DISINTEGRATING ORAL at 20:26

## 2023-01-07 RX ADMIN — Medication 1 TABLET(S): at 11:23

## 2023-01-07 RX ADMIN — DIVALPROEX SODIUM 250 MILLIGRAM(S): 500 TABLET, DELAYED RELEASE ORAL at 11:23

## 2023-01-07 RX ADMIN — DIVALPROEX SODIUM 250 MILLIGRAM(S): 500 TABLET, DELAYED RELEASE ORAL at 20:25

## 2023-01-07 NOTE — DISCHARGE NOTE PROVIDER - PROVIDER TOKENS
PROVIDER:[TOKEN:[[id number]],FOLLOWUP:[1 week]],PROVIDER:[TOKEN:[[id number]],FOLLOWUP:[1 week]]

## 2023-01-07 NOTE — DISCHARGE NOTE PROVIDER - NSDCMRMEDTOKEN_GEN_ALL_CORE_FT
Aleve 220 mg oral tablet: 1 tab(s) orally every 12 hours, As Needed  Calcium 600+D oral tablet: 1 tab(s) orally once a day  escitalopram 10 mg oral tablet: 1 tab(s) orally once a day (at bedtime)  ezetimibe 10 mg oral tablet: 1 tab(s) orally once a day  galantamine 4 mg oral tablet: 1 tab(s) orally once a day (in the evening)  lamoTRIgine 100 mg oral tablet, extended release: 1 tab(s) orally 2 times a day    ***Patient takes two 25 mg tablets with one 100 mg tablet twice a day. Total dose 150 mg in the morning and 150 mg at night.***  lamoTRIgine 25 mg oral tablet, extended release: 2 tab(s) orally 2 times a day    ***Patient takes two 25 mg tablets with one 100 mg tablet. Total dose 150 mg in the morning and 150 mg at night.***  levETIRAcetam 750 mg oral tablet, extended release: 2 tab(s) orally 2 times a day  Multiple Vitamins oral tablet: 1 tab(s) orally once a day  Omega-3 1000 mg oral capsule: 1 cap(s) orally once a day   Aleve 220 mg oral tablet: 1 tab(s) orally every 12 hours, As Needed  Calcium 600+D oral tablet: 1 tab(s) orally once a day  divalproex sodium 250 mg oral delayed release tablet: 1 tab(s) orally every 12 hours  escitalopram 10 mg oral tablet: 1 tab(s) orally once a day (at bedtime)  ezetimibe 10 mg oral tablet: 1 tab(s) orally once a day  galantamine 4 mg oral tablet: 1 tab(s) orally once a day (in the evening)  lamoTRIgine 100 mg oral tablet, extended release: 1 tab(s) orally 2 times a day    ***Patient takes two 25 mg tablets with one 100 mg tablet twice a day. Total dose 150 mg in the morning and 150 mg at night.***  lamoTRIgine 25 mg oral tablet, extended release: 2 tab(s) orally 2 times a day    ***Patient takes two 25 mg tablets with one 100 mg tablet. Total dose 150 mg in the morning and 150 mg at night.***  levETIRAcetam 750 mg oral tablet, extended release: 2 tab(s) orally 2 times a day  Multiple Vitamins oral tablet: 1 tab(s) orally once a day  Omega-3 1000 mg oral capsule: 1 cap(s) orally once a day

## 2023-01-07 NOTE — DISCHARGE NOTE PROVIDER - HOSPITAL COURSE
CC: seizures  HPI and Hospital Course: 81 y/o F with PMHx seizure disorder, HLD, Anxiety, admitted for multiple seizures. EEG studies were done including prolonged EEG, depakote was added.     VITALS:  T(F): 97.6 (01-07-23 @ 08:33), Max: 98 (01-06-23 @ 20:17)  HR: 81 (01-07-23 @ 08:33) (81 - 95)  BP: 138/64 (01-07-23 @ 08:33) (135/57 - 138/64)  RR: 18 (01-07-23 @ 08:33) (18 - 19)  SpO2: 94% (01-07-23 @ 08:33) (94% - 99%)    PHYSICAL EXAM:  General: NAD, lying in bed  HEENT:  pupils equal and reactive, EOMI, no oropharyngeal lesions, erythema, exudates, oral thrush  NECK:   supple, no carotid bruits, no palpable lymph nodes, no thyromegaly  CV:  +S1, +S2, regular, no murmurs or rubs  RESP:   lungs clear to auscultation bilaterally, no wheezing, rales, rhonchi, good air entry bilaterally  BREAST:  not examined  GI:  abdomen soft, non-tender, non-distended, normal BS, no bruits, no abdominal masses, no palpable masses  RECTAL:  not examined  :  not examined  MSK:   normal muscle tone, no atrophy, no rigidity, no contractions  EXT:  no clubbing, no cyanosis, no edema, no calf pain, swelling or erythema  VASCULAR:  pulses equal and symmetric in the upper and lower extremities  NEURO:  AAOX3, no focal neurological deficits, follows all commands, able to move extremities spontaneously  SKIN:  no ulcers, lesions or rashes    COVID neg  UCx NG    Additional results/Imaging, I have personally reviewed:  CXR 1/5/23: neg    CTH 1/5/23: Stable exam. No acute intracranial hemorrhage, vasogenic edema, extra-axial collection or hydrocephalus. Chronic microvascular changes as above.    EEG 1/6/23: Impression: The findings are suggestive of: -Diffuse cerebral dysfunction -A generalized seizure disorder.    EEG 1/7/23:  Abnormal EEG study Mild generalized background slowing - Clinical Impression: Mild diffuse/multi-focal cerebral dysfunction, not specific as to etiology. There were no epileptiform abnormalities or seizures recorded x 1 day. Substantial left hemispheric lead artifacts in latter portions of the record limiting interpretation -      #Seizure disorder  -Admit to tele   -CT head without acute pathology   -Admission 4/2022 for status epilepticus   -S/p 4 tonic clonic seizures on day of admission  -S/p Keppra 1500mg IV and ativan 2mg IV x 1 in ER  -Patient has been complaint with medication   -Noted on PCP visit on HIE on 12/21/22, pt had absence seizures in 11/22, and her meds were increased, noting lamotrigine 150mg BID    -As per med rec with pharmacist pt filled: Keppra 1500mg BID, lamotrigine ER 125mg BID 12/12/22  -continue Keppra, lamotrigine  -AED dosing per neuro consult, depakote added (LFTs nl, NH3 neg)  -prolonged EEG noted  -appreciate neuro recs,, will need outpt neuro f/u this week w Dr. Barrett and check AED levels    #Cognitive impairment   -On Galantamine 4mg BID at home    #VTE ppx  -SCDs    Updated family on 1/7  F2F    I have spent 32 min on day of d/c coordinating care.           CC: seizures  HPI and Hospital Course: 83 y/o F with PMHx seizure disorder, HLD, Anxiety, admitted for multiple seizures. EEG studies were done including prolonged EEG, depakote was added.     Vital Signs Last 24 Hrs  T(C): 36.3 (08 Jan 2023 08:04), Max: 37.1 (07 Jan 2023 20:03)  T(F): 97.3 (08 Jan 2023 08:04), Max: 98.7 (07 Jan 2023 20:03)  HR: 77 (08 Jan 2023 08:04) (77 - 88)  BP: 139/63 (08 Jan 2023 08:04) (117/83 - 139/63)  BP(mean): --  RR: 17 (08 Jan 2023 08:04) (17 - 18)  SpO2: 98% (08 Jan 2023 08:04) (96% - 98%)    PHYSICAL EXAM:  General: NAD, lying in bed  HEENT:  pupils equal and reactive, EOMI, no oropharyngeal lesions, erythema, exudates, oral thrush  NECK:   supple, no carotid bruits, no palpable lymph nodes, no thyromegaly  CV:  +S1, +S2, regular, no murmurs or rubs  RESP:   lungs clear to auscultation bilaterally, no wheezing, rales, rhonchi, good air entry bilaterally  BREAST:  not examined  GI:  abdomen soft, non-tender, non-distended, normal BS, no bruits, no abdominal masses, no palpable masses  RECTAL:  not examined  :  not examined  MSK:   normal muscle tone, no atrophy, no rigidity, no contractions  EXT:  no clubbing, no cyanosis, no edema, no calf pain, swelling or erythema  VASCULAR:  pulses equal and symmetric in the upper and lower extremities  NEURO:  AAOX3, no focal neurological deficits, follows all commands, able to move extremities spontaneously  SKIN:  no ulcers, lesions or rashes    COVID neg  UCx NG    Additional results/Imaging, I have personally reviewed:  CXR 1/5/23: neg    CTH 1/5/23: Stable exam. No acute intracranial hemorrhage, vasogenic edema, extra-axial collection or hydrocephalus. Chronic microvascular changes as above.    EEG 1/6/23: Impression: The findings are suggestive of: -Diffuse cerebral dysfunction -A generalized seizure disorder.    EEG 1/7/23:  Abnormal EEG study Mild generalized background slowing - Clinical Impression: Mild diffuse/multi-focal cerebral dysfunction, not specific as to etiology. There were no epileptiform abnormalities or seizures recorded x 1 day. Substantial left hemispheric lead artifacts in latter portions of the record limiting interpretation -      #Seizure disorder  -Admit to tele   -CT head without acute pathology   -Admission 4/2022 for status epilepticus   -S/p 4 tonic clonic seizures on day of admission  -S/p Keppra 1500mg IV and ativan 2mg IV x 1 in ER  -Patient has been complaint with medication   -Noted on PCP visit on HIE on 12/21/22, pt had absence seizures in 11/22, and her meds were increased, noting lamotrigine 150mg BID    -As per med rec with pharmacist pt filled: Keppra 1500mg BID, lamotrigine ER 125mg BID 12/12/22  -continue Keppra, lamotrigine  -AED dosing per neuro consult, depakote added (LFTs nl, NH3 neg)  -prolonged EEG noted  -appreciate neuro recs, will need outpt neuro f/u this week w Dr. Barrett and check AED levels    #Cognitive impairment   -On Galantamine 4mg BID at home    #VTE ppx  -SCDs    Updated family on 1/7  F2F    I have spent 32 min on day of d/c coordinating care.

## 2023-01-07 NOTE — DISCHARGE NOTE PROVIDER - CARE PROVIDER_API CALL
Haroon Barrett)  Neurology  [address] [place] [address]  Phone: [phone]  Fax: [phone]  Follow Up Time: 1 week    Mattie Horton)  Internal Medicine  [address] [place] [address]  Phone: [phone]  Fax: [phone]  Follow Up Time: 1 week

## 2023-01-07 NOTE — PROGRESS NOTE ADULT - ASSESSMENT
81 y/o F with PMHx seizure disorder, status epilepticus, HLD, Anxiety, BIBEMS for multiple seizures. As per her , patient went for an MRI today at ~ 4pm and when she returned suffered 2 seizures at home. EMS was called and en route patient suffered another seizure, and had a 4th episode in ER on arrival. Seizures described as tonic-clonic. As per  patient has been compliant with her medications. Patient was given ativan 2mg IV x1 and Keppra 1500mg x 1 in ER. She has had previous admissions during which time she was in status epilepticus. Her outpatient neurologist reports that she is sometimes encephalopathic.    # Status epilepticus, routine EEG did not show evidence of status epilepticus, prior EEGs were read as generalized spike and wave. Low dose Depakote that was added to Keppra and lamotrigine    24 hr EEG done completed, showed mild generalized background slowing, c/w mild diffuse/multi-focal cerebral dysfunction, not specific as to etiology. No epileptiform abnormalities or seizures recorded x 1 day    -Continue Depakote 250 mg q12  -Continue Keppra 1500 mg bid and Lamotrigine 150 mg BID.  - NH3 - < 10    Above D/W Pt and Dr. Griffin.  
83 y/o F with PMHx seizure disorder, HLD, Anxiety, admitted for multiple seizures.     #Seizure disorder  -Admit to tele   -CT head without acute pathology   -Admission 4/2022 for status epilepticus   -S/p 4 tonic clonic seizures on day of admission  -S/p Keppra 1500mg IV and ativan 2mg IV x 1 in ER  -Patient has been complaint with medication   -Noted on PCP visit on [facility] on 12/21/22, pt had absence seizures in 11/22, and her meds were increased, noting lamotrigine 150mg BID    -As per med rec with pharmacist pt filled: Keppra 1500mg BID, lamotrigine ER 125mg BID 12/12/22  -continue Keppra, lamotrigine  -AED dosing per neuro consult, depakote added (LFTs nl, NH3 neg)  -prolonged EEG  -f/u neuro recs    #Cognitive impairment   -On Galantamine 4mg BID at home    #VTE ppx  -SCDs      
83 y/o F with PMHx seizure disorder, HLD, Anxiety, admitted for multiple seizures.     #Seizure disorder  -Admit to tele   -CT head without acute pathology   -Admission 4/2022 for status epilepticus   -S/p 4 tonic clonic seizures on day of admission  -S/p Keppra 1500mg IV and ativan 2mg IV x 1 in ER  -Patient has been complaint with medication   -Noted on PCP visit on [facility] on 12/21/22, pt had absence seizures in 11/22, and her meds were increased, noting lamotrigine 150mg BID    -As per med rec with pharmacist pt filled: Keppra 1500mg BID, lamotrigine ER 125mg BID 12/12/22  -continue Keppra, lamotrigine  -AED dosing per neuro consult, depakote added (LFTs nl, NH3 neg)  -prolonged EEG noted  -appreciate neuro recs, anticipate d/c tmrw, will need outpt neuro f/u this week and check AED levels    #Cognitive impairment   -On Galantamine 4mg BID at home    #VTE ppx  -SCDs    Updated family    F2F on d/c

## 2023-01-07 NOTE — PROGRESS NOTE ADULT - SUBJECTIVE AND OBJECTIVE BOX
HOSPITALIST ATTENDING PROGRESS NOTE    Chart and meds reviewed.  Patient seen and examined.    CC: seizures    Subjective: Awake, impulsive, unsteady on feet. Denies HA, CP, SOB.    All other systems reviewed and found to be negative with the exception of what has been described above.    MEDICATIONS  (STANDING):  divalproex  milliGRAM(s) Oral every 12 hours  escitalopram 10 milliGRAM(s) Oral at bedtime  lamoTRIgine 150 milliGRAM(s) Oral every 12 hours  levETIRAcetam 1500 milliGRAM(s) Oral two times a day  multivitamin 1 Tablet(s) Oral daily    MEDICATIONS  (PRN):  acetaminophen     Tablet .. 650 milliGRAM(s) Oral every 6 hours PRN Temp greater or equal to 38C (100.4F), Mild Pain (1 - 3)  aluminum hydroxide/magnesium hydroxide/simethicone Suspension 30 milliLiter(s) Oral every 4 hours PRN Dyspepsia  LORazepam   Injectable 2 milliGRAM(s) IV Push once PRN seizure  melatonin 3 milliGRAM(s) Oral at bedtime PRN Insomnia  ondansetron Injectable 4 milliGRAM(s) IV Push every 8 hours PRN Nausea and/or Vomiting      VITALS:  T(F): 97.6 (23 @ 08:33), Max: 98 (23 @ 20:17)  HR: 81 (23 @ 08:33) (81 - 95)  BP: 138/64 (23 @ 08:33) (135/57 - 138/64)  RR: 18 (23 @ 08:33) (18 - 19)  SpO2: 94% (23 @ 08:33) (94% - 99%)  Wt(kg): --    I&O's Summary      CAPILLARY BLOOD GLUCOSE      POCT Blood Glucose.: 97 mg/dL (2023 11:20)      PHYSICAL EXAM:  Gen: NAD  HEENT:  pupils equal and reactive, EOMI, no oropharyngeal lesions, erythema, exudates, oral thrush  NECK:   supple, no carotid bruits, no palpable lymph nodes, no thyromegaly  CV:  +S1, +S2, regular, no murmurs or rubs  RESP:   lungs clear to auscultation bilaterally, no wheezing, rales, rhonchi, good air entry bilaterally  BREAST:  not examined  GI:  abdomen soft, non-tender, non-distended, normal BS, no bruits, no abdominal masses, no palpable masses  RECTAL:  not examined  :  not examined  MSK:   normal muscle tone, no atrophy, no rigidity, no contractions  EXT:  no clubbing, no cyanosis, no edema, no calf pain, swelling or erythema  VASCULAR:  pulses equal and symmetric in the upper and lower extremities  NEURO:  AAOX3, no focal neurological deficits, follows all commands, able to move extremities spontaneously  SKIN:  no ulcers, lesions or rashes    LABS:                            12.6   8.70  )-----------( 226      ( 2023 07:42 )             37.5     01-06    133<L>  |  98  |  12  ----------------------------<  119<H>  3.8   |  29  |  0.72    Ca    8.8      2023 07:42  Phos  3.1     01-05  Mg     2.0     -05    TPro  6.7  /  Alb  3.4  /  TBili  0.4  /  DBili  x   /  AST  26  /  ALT  26  /  AlkPhos  71  01-05        LIVER FUNCTIONS - ( 2023 20:22 )  Alb: 3.4 g/dL / Pro: 6.7 gm/dL / ALK PHOS: 71 U/L / ALT: 26 U/L / AST: 26 U/L / GGT: x             Urinalysis Basic - ( 2023 20:17 )    Color: Yellow / Appearance: Clear / S.010 / pH: x  Gluc: x / Ketone: Negative  / Bili: Negative / Urobili: Negative   Blood: x / Protein: Negative / Nitrite: Negative   Leuk Esterase: Negative / RBC: x / WBC x   Sq Epi: x / Non Sq Epi: x / Bacteria: x    CULTURES:  COVID neg  UCx NG    Additional results/Imaging, I have personally reviewed:  CXR 23: neg    CTH 23: Stable exam. No acute intracranial hemorrhage, vasogenic edema, extra-axial collection or hydrocephalus. Chronic microvascular changes as above.    EEG 23: Impression: The findings are suggestive of: -Diffuse cerebral dysfunction -A generalized seizure disorder.    EEG 1/7/23:  Abnormal EEG study Mild generalized background slowing - Clinical Impression: Mild diffuse/multi-focal cerebral dysfunction, not specific as to etiology. There were no epileptiform abnormalities or seizures recorded x 1 day. Substantial left hemispheric lead artifacts in latter portions of the record limiting interpretation -    Telemetry, personally reviewed:  23: sinus 60-80, PVCs  23; sinus  
HOSPITALIST ATTENDING PROGRESS NOTE    Chart and meds reviewed.  Patient seen and examined.    CC: seizures    Subjective: Pt with slow response time. Denies CP, SOB, HA.     All other systems reviewed and found to be negative with the exception of what has been described above.    MEDICATIONS  (STANDING):  divalproex  milliGRAM(s) Oral every 12 hours  escitalopram 10 milliGRAM(s) Oral at bedtime  lamoTRIgine 150 milliGRAM(s) Oral every 12 hours  levETIRAcetam 1500 milliGRAM(s) Oral two times a day  multivitamin 1 Tablet(s) Oral daily    MEDICATIONS  (PRN):  acetaminophen     Tablet .. 650 milliGRAM(s) Oral every 6 hours PRN Temp greater or equal to 38C (100.4F), Mild Pain (1 - 3)  aluminum hydroxide/magnesium hydroxide/simethicone Suspension 30 milliLiter(s) Oral every 4 hours PRN Dyspepsia  LORazepam   Injectable 2 milliGRAM(s) IV Push once PRN seizure  melatonin 3 milliGRAM(s) Oral at bedtime PRN Insomnia  ondansetron Injectable 4 milliGRAM(s) IV Push every 8 hours PRN Nausea and/or Vomiting      VITALS:  T(F): 97.4 (23 @ 09:09), Max: 98.6 (23 @ 22:45)  HR: 61 (23 @ 09:09) (61 - 90)  BP: 133/55 (23 @ 09:09) (127/95 - 180/82)  RR: 19 (23 @ 09:09) (16 - 20)  SpO2: 97% (23 @ 09:09) (97% - 100%)  Wt(kg): --    I&O's Summary      CAPILLARY BLOOD GLUCOSE          PHYSICAL EXAM:  Gen: NAD  HEENT:  pupils equal and reactive, EOMI, no oropharyngeal lesions, erythema, exudates, oral thrush  NECK:   supple, no carotid bruits, no palpable lymph nodes, no thyromegaly  CV:  +S1, +S2, regular, no murmurs or rubs  RESP:   lungs clear to auscultation bilaterally, no wheezing, rales, rhonchi, good air entry bilaterally  BREAST:  not examined  GI:  abdomen soft, non-tender, non-distended, normal BS, no bruits, no abdominal masses, no palpable masses  RECTAL:  not examined  :  not examined  MSK:   normal muscle tone, no atrophy, no rigidity, no contractions  EXT:  no clubbing, no cyanosis, no edema, no calf pain, swelling or erythema  VASCULAR:  pulses equal and symmetric in the upper and lower extremities  NEURO: slow response time, AAOX2, no focal neurological deficits, follows all commands, able to move extremities spontaneously  SKIN:  no ulcers, lesions or rashes    LABS:                            12.6   8.70  )-----------( 226      ( 2023 07:42 )             37.5     -    133<L>  |  98  |  12  ----------------------------<  119<H>  3.8   |  29  |  0.72    Ca    8.8      2023 07:42  Phos  3.1     -  Mg     2.0         TPro  6.7  /  Alb  3.4  /  TBili  0.4  /  DBili  x   /  AST  26  /  ALT  26  /  AlkPhos  71          LIVER FUNCTIONS - ( 2023 20:22 )  Alb: 3.4 g/dL / Pro: 6.7 gm/dL / ALK PHOS: 71 U/L / ALT: 26 U/L / AST: 26 U/L / GGT: x             Urinalysis Basic - ( 2023 20:17 )    Color: Yellow / Appearance: Clear / S.010 / pH: x  Gluc: x / Ketone: Negative  / Bili: Negative / Urobili: Negative   Blood: x / Protein: Negative / Nitrite: Negative   Leuk Esterase: Negative / RBC: x / WBC x   Sq Epi: x / Non Sq Epi: x / Bacteria: x    Blood, Urine: Negative ( @ 20:17)    CULTURES:  Blood, Urine: Negative ( @ 20:17)  COVID neg  UCx pending    Additional results/Imaging, I have personally reviewed:  CXR 23: neg    CTH 23: Stable exam. No acute intracranial hemorrhage, vasogenic edema, extra-axial collection or hydrocephalus. Chronic microvascular changes as above.    EEG 23: Impression: The findings are suggestive of: -Diffuse cerebral dysfunction -A generalized seizure disorder.    Telemetry, personally reviewed:  23: sinus 60-80, PVCs
Pt with status epilepticus, remarkable improvement with low dose Depakote that was added to Keppra  and lamotrigine    24 hr EEG done completed, showed mild generalized background slowing, c/w mild diffuse/multi-focal cerebral dysfunction, not specific as to etiology. There were no epileptiform abnormalities or seizures recorded x 1 day.     NH3 - < 10      ROS: Pertinent positives in HPI, all other ROS were reviewed and are negative.        MEDICATIONS  (STANDING):  divalproex  milliGRAM(s) Oral every 12 hours  escitalopram 10 milliGRAM(s) Oral at bedtime  lamoTRIgine 150 milliGRAM(s) Oral every 12 hours  levETIRAcetam 1500 milliGRAM(s) Oral two times a day  multivitamin 1 Tablet(s) Oral daily      Vital Signs Last 24 Hrs  T(C): 36.4 (07 Jan 2023 08:33), Max: 36.7 (06 Jan 2023 20:17)  T(F): 97.6 (07 Jan 2023 08:33), Max: 98 (06 Jan 2023 20:17)  HR: 81 (07 Jan 2023 08:33) (81 - 95)  BP: 138/64 (07 Jan 2023 08:33) (135/57 - 138/64)  BP(mean): --  RR: 18 (07 Jan 2023 08:33) (18 - 19)  SpO2: 94% (07 Jan 2023 08:33) (94% - 99%)    Parameters below as of 07 Jan 2023 08:33  Patient On (Oxygen Delivery Method): room air    Neurological exam:  HF: Awake and alert. Oriented to person. Not oriented to place or month. Able to state year.     CN: NINO, EOMI, VFF, facial sensation normal, no NLFD, tongue midline, Palate moves equally, SCM equal bilaterally  Motor: No pronator drift, Strength 5/5 in all 4 ext, normal bulk and tone, no tremor.    Sens: Intact to light touch   Reflexes: Symmetric and normal. downgoing toes b/l  Coord:  No FNFA, dysmetria, ASHUTOSH intact   Gait/Balance: Normal/Cannot test                          12.6   8.70  )-----------( 226      ( 06 Jan 2023 07:42 )             37.5     01-06    133<L>  |  98  |  12  ----------------------------<  119<H>  3.8   |  29  |  0.72    Ca    8.8      06 Jan 2023 07:42  Phos  3.1     01-05  Mg     2.0     01-05    TPro  6.7  /  Alb  3.4  /  TBili  0.4  /  DBili  x   /  AST  26  /  ALT  26  /  AlkPhos  71  01-05        Radiology report:  CT head 1/5/23:  Stable exam. No acute intracranial hemorrhage, vasogenic edema,   extra-axial collection or hydrocephalus. Chronic microvascular changes as  above.    - EEG Classification / Summary:  Abnormal EEG study  Mild generalized background slowing    -----------------------------------------------------------------------------------------------------    Clinical Impression:  Mild diffuse/multi-focal cerebral dysfunction, not specific as to etiology.  There were no epileptiform abnormalities or seizures recorded x 1 day.    Substantial left hemispheric lead artifacts in latter portions of the record limiting interpretation  -------------------------------------------------------------------------------------------------------  [facility] EEG Reading Room Ph#: [phone]  Epilepsy Answering Service after 5PM and before 8:30AM: Ph#: [phone]    Harish Zepeda M.D.    < from: EEG Awake or Drowsy (01.06.23 @ 10:00) >  EEG Summary/Classification:  This was an abnormal EEG study in the awake and drowsy states due to:  -mild generalized slowing  -A single burst of generalized spike and wave activity lasting about 0.5   seconds was seen in between photic stimulation.    EEG 1/5/23: A single burst of generalized spike and wave  Mild generalized slowing

## 2023-01-07 NOTE — EEG REPORT - NS EEG TEXT BOX
MITESH WALSH [id number]     Study Date: 		01-06 14:24-08:00 1-7-23  Duration in hours:  x    --------------------------------------------------------------------------------------------------  History:  CC/ HPI Patient is a 82y old  Female who presents with a chief complaint of Seizures (06 Jan 2023 15:02)    MEDICATIONS  (STANDING):  divalproex  milliGRAM(s) Oral every 12 hours  escitalopram 10 milliGRAM(s) Oral at bedtime  lamoTRIgine 150 milliGRAM(s) Oral every 12 hours  levETIRAcetam 1500 milliGRAM(s) Oral two times a day  multivitamin 1 Tablet(s) Oral daily    --------------------------------------------------------------------------------------------------  Study Interpretation:    [Abbreviation Key:  PDR=alpha rhythm/posterior dominant rhythm. A-P=anterior posterior.  Amplitude: ‘very low’:<20; ‘low’:20-49; ‘medium’:; ‘high’:>150uV.  Persistence for periodic/rhythmic patterns (% of epoch) ‘rare’:<1%; ‘occasional’:1-10%; ‘frequent’:10-50%; ‘abundant’:50-90%; ‘continuous’:>90%.  Persistence for sporadic discharges: ‘rare’:<1/hr; ‘occasional’:1/min-1/hr; ‘frequent’:>1/min; ‘abundant’:>1/10 sec.  RPP=rhythmic and periodic patterns; GRDA=generalized rhythmic delta activity; FIRDA=frontal intermittent GRDA; LRDA=lateralized rhythmic delta activity; TIRDA=temporal intermittent rhythmic delta activity;  LPD=PLED=lateralized periodic discharges; GPD=generalized periodic discharges; BIPDs =bilateral independent periodic discharges; Mf=multifocal; SIRPDs=stimulus induced rhythmic, periodic, or ictal appearing discharges; BIRDs=brief potentially ictal rhythmic discharges >4 Hz, lasting .5-10s; PFA (paroxysmal bursts >13 Hz or =8 Hz <10s).  Modifiers: +F=with fast component; +S=with spike component; +R=with rhythmic component.  S-B=burst suppression pattern.  Max=maximal. N1-drowsy; N2-stage II sleep; N3-slow wave sleep. SSS/BETS=small sharp spikes/benign epileptiform transients of sleep. HV=hyperventilation; PS=photic stimulation]    Daily EEG Visual Analysis  FINDINGS:      Background:  Continuity: continuous  Symmetry: symmetric  PDR: 7-7.5Hz activity, with amplitude to 45 uV, that attenuated to eye opening.    Reactivity: present  Voltage: normal (between 20-150uV)  Anterior Posterior Gradient: present  Other background findings: none  Breach: absent    Background Slowing:  Generalized slowing: diffuse irregular delta and theta activity.  Focal slowing: none was present.    State Changes:   -Drowsiness noted with increased slowing, attenuation of fast activity, vertex transients.  -Present with N2 sleep transients with symmetric spindles and K-complexes.    Sporadic Epileptiform Discharges:    None    Rhythmic and Periodic Patterns (RPPs):  None     Electrographic and Electroclinical seizures:  None    Other Clinical Events:  None    Activation Procedures:   -Hyperventilation was not performed.    -Photic stimulation was not performed.     Artifacts:  Intermittent myogenic and movement artifacts were noted.    ECG:  The heart rate on single channel ECG was predominantly between 70-80BPM.    EEG Classification / Summary:  Abnormal EEG study  Mild generalized background slowing    -----------------------------------------------------------------------------------------------------    Clinical Impression:  Mild diffuse/multi-focal cerebral dysfunction, not specific as to etiology.  There were no epileptiform abnormalities or seizures recorded x 1 day.      -------------------------------------------------------------------------------------------------------  Health system EEG Reading Room Ph#: [phone]  Epilepsy Answering Service after 5PM and before 8:30AM: Ph#: [phone]    Harish Zepeda M.D.   of Neurology, [facility]	   MITESH WALSH [id number]     Study Date: 		01-06 14:24-08:00 1-7-23  Duration in hours:  x    --------------------------------------------------------------------------------------------------  History:  CC/ HPI Patient is a 82y old  Female who presents with a chief complaint of Seizures (06 Jan 2023 15:02)    MEDICATIONS  (STANDING):  divalproex  milliGRAM(s) Oral every 12 hours  escitalopram 10 milliGRAM(s) Oral at bedtime  lamoTRIgine 150 milliGRAM(s) Oral every 12 hours  levETIRAcetam 1500 milliGRAM(s) Oral two times a day  multivitamin 1 Tablet(s) Oral daily    --------------------------------------------------------------------------------------------------  Study Interpretation:    [Abbreviation Key:  PDR=alpha rhythm/posterior dominant rhythm. A-P=anterior posterior.  Amplitude: ‘very low’:<20; ‘low’:20-49; ‘medium’:; ‘high’:>150uV.  Persistence for periodic/rhythmic patterns (% of epoch) ‘rare’:<1%; ‘occasional’:1-10%; ‘frequent’:10-50%; ‘abundant’:50-90%; ‘continuous’:>90%.  Persistence for sporadic discharges: ‘rare’:<1/hr; ‘occasional’:1/min-1/hr; ‘frequent’:>1/min; ‘abundant’:>1/10 sec.  RPP=rhythmic and periodic patterns; GRDA=generalized rhythmic delta activity; FIRDA=frontal intermittent GRDA; LRDA=lateralized rhythmic delta activity; TIRDA=temporal intermittent rhythmic delta activity;  LPD=PLED=lateralized periodic discharges; GPD=generalized periodic discharges; BIPDs =bilateral independent periodic discharges; Mf=multifocal; SIRPDs=stimulus induced rhythmic, periodic, or ictal appearing discharges; BIRDs=brief potentially ictal rhythmic discharges >4 Hz, lasting .5-10s; PFA (paroxysmal bursts >13 Hz or =8 Hz <10s).  Modifiers: +F=with fast component; +S=with spike component; +R=with rhythmic component.  S-B=burst suppression pattern.  Max=maximal. N1-drowsy; N2-stage II sleep; N3-slow wave sleep. SSS/BETS=small sharp spikes/benign epileptiform transients of sleep. HV=hyperventilation; PS=photic stimulation]    Daily EEG Visual Analysis  FINDINGS:      Background:  Continuity: continuous  Symmetry: symmetric  PDR: 7-7.5Hz activity, with amplitude to 45 uV, that attenuated to eye opening.    Reactivity: present  Voltage: normal (between 20-150uV)  Anterior Posterior Gradient: present  Other background findings: none  Breach: absent    Background Slowing:  Generalized slowing: diffuse irregular delta and theta activity.  Focal slowing: none was present.    State Changes:   -Drowsiness noted with increased slowing, attenuation of fast activity, vertex transients.  -Present with N2 sleep transients with symmetric spindles and K-complexes.    Sporadic Epileptiform Discharges:    None    Rhythmic and Periodic Patterns (RPPs):  None     Electrographic and Electroclinical seizures:  None    Other Clinical Events:  None    Activation Procedures:   -Hyperventilation was not performed.    -Photic stimulation was not performed.     Artifacts:  Intermittent myogenic and movement artifacts were noted.  Substantial left hemispheric lead artifacts in latter portions of the record    ECG:  The heart rate on single channel ECG was predominantly between 70-80BPM.    EEG Classification / Summary:  Abnormal EEG study  Mild generalized background slowing    -----------------------------------------------------------------------------------------------------    Clinical Impression:  Mild diffuse/multi-focal cerebral dysfunction, not specific as to etiology.  There were no epileptiform abnormalities or seizures recorded x 1 day.    Substantial left hemispheric lead artifacts in latter portions of the record limiting interpretation  -------------------------------------------------------------------------------------------------------  [facility] EEG Reading Room Ph#: [phone]  Epilepsy Answering Service after 5PM and before 8:30AM: Ph#: [phone]    Harish Zepeda M.D.   of Neurology, [facility]

## 2023-01-07 NOTE — DISCHARGE NOTE PROVIDER - NSDCCPCAREPLAN_GEN_ALL_CORE_FT
PRINCIPAL DISCHARGE DIAGNOSIS  Diagnosis: Seizures  Assessment and Plan of Treatment: Your medications have been changed as described on your current med list. See your neurologist this week and get medication blood levels checked, as depakote can raise lamictal levels.

## 2023-01-08 VITALS
TEMPERATURE: 97 F | RESPIRATION RATE: 17 BRPM | HEART RATE: 77 BPM | SYSTOLIC BLOOD PRESSURE: 139 MMHG | DIASTOLIC BLOOD PRESSURE: 63 MMHG | OXYGEN SATURATION: 98 %

## 2023-01-08 PROCEDURE — 99239 HOSP IP/OBS DSCHRG MGMT >30: CPT

## 2023-01-08 RX ORDER — DIVALPROEX SODIUM 500 MG/1
1 TABLET, DELAYED RELEASE ORAL
Qty: 60 | Refills: 0
Start: 2023-01-08 | End: 2023-02-06

## 2023-01-08 RX ADMIN — LEVETIRACETAM 1500 MILLIGRAM(S): 250 TABLET, FILM COATED ORAL at 10:57

## 2023-01-08 RX ADMIN — DIVALPROEX SODIUM 250 MILLIGRAM(S): 500 TABLET, DELAYED RELEASE ORAL at 10:58

## 2023-01-08 RX ADMIN — Medication 1 TABLET(S): at 10:57

## 2023-01-08 RX ADMIN — LAMOTRIGINE 150 MILLIGRAM(S): 25 TABLET, ORALLY DISINTEGRATING ORAL at 10:57

## 2023-01-09 LAB — LEVETIRACETAM SERPL-MCNC: 47.9 UG/ML — HIGH (ref 10–40)

## 2023-01-12 DIAGNOSIS — G40.409 OTHER GENERALIZED EPILEPSY AND EPILEPTIC SYNDROMES, NOT INTRACTABLE, WITHOUT STATUS EPILEPTICUS: ICD-10-CM

## 2023-01-12 DIAGNOSIS — F41.9 ANXIETY DISORDER, UNSPECIFIED: ICD-10-CM

## 2023-01-12 DIAGNOSIS — Z96.651 PRESENCE OF RIGHT ARTIFICIAL KNEE JOINT: ICD-10-CM

## 2023-01-12 DIAGNOSIS — Z79.899 OTHER LONG TERM (CURRENT) DRUG THERAPY: ICD-10-CM

## 2023-01-12 DIAGNOSIS — F09 UNSPECIFIED MENTAL DISORDER DUE TO KNOWN PHYSIOLOGICAL CONDITION: ICD-10-CM

## 2023-01-12 DIAGNOSIS — E78.5 HYPERLIPIDEMIA, UNSPECIFIED: ICD-10-CM

## 2023-01-12 DIAGNOSIS — Z20.822 CONTACT WITH AND (SUSPECTED) EXPOSURE TO COVID-19: ICD-10-CM

## 2023-01-13 ENCOUNTER — INPATIENT (INPATIENT)
Facility: HOSPITAL | Age: 83
LOS: 4 days | Discharge: ANOTHER HOSPITAL | DRG: 101 | End: 2023-01-18
Attending: HOSPITALIST | Admitting: FAMILY MEDICINE
Payer: MEDICARE

## 2023-01-13 VITALS
WEIGHT: 139.99 LBS | HEART RATE: 86 BPM | DIASTOLIC BLOOD PRESSURE: 71 MMHG | HEIGHT: 64 IN | SYSTOLIC BLOOD PRESSURE: 122 MMHG | TEMPERATURE: 99 F | OXYGEN SATURATION: 96 % | RESPIRATION RATE: 18 BRPM

## 2023-01-13 LAB
ALBUMIN SERPL ELPH-MCNC: 3.3 G/DL — SIGNIFICANT CHANGE UP (ref 3.3–5)
ALP SERPL-CCNC: 57 U/L — SIGNIFICANT CHANGE UP (ref 40–120)
ALT FLD-CCNC: 24 U/L — SIGNIFICANT CHANGE UP (ref 12–78)
ANION GAP SERPL CALC-SCNC: 5 MMOL/L — SIGNIFICANT CHANGE UP (ref 5–17)
AST SERPL-CCNC: 23 U/L — SIGNIFICANT CHANGE UP (ref 15–37)
BASE EXCESS BLDV CALC-SCNC: 6.2 MMOL/L — SIGNIFICANT CHANGE UP (ref -2–3)
BASOPHILS # BLD AUTO: 0.02 K/UL — SIGNIFICANT CHANGE UP (ref 0–0.2)
BASOPHILS NFR BLD AUTO: 0.2 % — SIGNIFICANT CHANGE UP (ref 0–2)
BILIRUB SERPL-MCNC: 0.5 MG/DL — SIGNIFICANT CHANGE UP (ref 0.2–1.2)
BUN SERPL-MCNC: 10 MG/DL — SIGNIFICANT CHANGE UP (ref 7–23)
CALCIUM SERPL-MCNC: 9.2 MG/DL — SIGNIFICANT CHANGE UP (ref 8.5–10.1)
CHLORIDE SERPL-SCNC: 99 MMOL/L — SIGNIFICANT CHANGE UP (ref 96–108)
CO2 BLDV-SCNC: 32 MMOL/L — HIGH (ref 22–26)
CO2 SERPL-SCNC: 31 MMOL/L — SIGNIFICANT CHANGE UP (ref 22–31)
CREAT SERPL-MCNC: 0.71 MG/DL — SIGNIFICANT CHANGE UP (ref 0.5–1.3)
EGFR: 86 ML/MIN/1.73M2 — SIGNIFICANT CHANGE UP
EOSINOPHIL # BLD AUTO: 0.12 K/UL — SIGNIFICANT CHANGE UP (ref 0–0.5)
EOSINOPHIL NFR BLD AUTO: 1.5 % — SIGNIFICANT CHANGE UP (ref 0–6)
FLUAV AG NPH QL: SIGNIFICANT CHANGE UP
FLUBV AG NPH QL: SIGNIFICANT CHANGE UP
GAS PNL BLDV: SIGNIFICANT CHANGE UP
GLUCOSE SERPL-MCNC: 100 MG/DL — HIGH (ref 70–99)
HCO3 BLDV-SCNC: 31 MMOL/L — HIGH (ref 22–29)
HCT VFR BLD CALC: 36.5 % — SIGNIFICANT CHANGE UP (ref 34.5–45)
HGB BLD-MCNC: 12.1 G/DL — SIGNIFICANT CHANGE UP (ref 11.5–15.5)
IMM GRANULOCYTES NFR BLD AUTO: 0.2 % — SIGNIFICANT CHANGE UP (ref 0–0.9)
LYMPHOCYTES # BLD AUTO: 1.02 K/UL — SIGNIFICANT CHANGE UP (ref 1–3.3)
LYMPHOCYTES # BLD AUTO: 12.3 % — LOW (ref 13–44)
MCHC RBC-ENTMCNC: 31.9 PG — SIGNIFICANT CHANGE UP (ref 27–34)
MCHC RBC-ENTMCNC: 33.2 GM/DL — SIGNIFICANT CHANGE UP (ref 32–36)
MCV RBC AUTO: 96.3 FL — SIGNIFICANT CHANGE UP (ref 80–100)
MONOCYTES # BLD AUTO: 0.97 K/UL — HIGH (ref 0–0.9)
MONOCYTES NFR BLD AUTO: 11.7 % — SIGNIFICANT CHANGE UP (ref 2–14)
NEUTROPHILS # BLD AUTO: 6.12 K/UL — SIGNIFICANT CHANGE UP (ref 1.8–7.4)
NEUTROPHILS NFR BLD AUTO: 74.1 % — SIGNIFICANT CHANGE UP (ref 43–77)
PCO2 BLDV: 42 MMHG — SIGNIFICANT CHANGE UP (ref 39–42)
PH BLDV: 7.47 — HIGH (ref 7.32–7.43)
PLATELET # BLD AUTO: 161 K/UL — SIGNIFICANT CHANGE UP (ref 150–400)
PO2 BLDV: 93 MMHG — SIGNIFICANT CHANGE UP (ref 25–45)
POTASSIUM SERPL-MCNC: 3.8 MMOL/L — SIGNIFICANT CHANGE UP (ref 3.5–5.3)
POTASSIUM SERPL-SCNC: 3.8 MMOL/L — SIGNIFICANT CHANGE UP (ref 3.5–5.3)
PROT SERPL-MCNC: 6.3 GM/DL — SIGNIFICANT CHANGE UP (ref 6–8.3)
RBC # BLD: 3.79 M/UL — LOW (ref 3.8–5.2)
RBC # FLD: 12.6 % — SIGNIFICANT CHANGE UP (ref 10.3–14.5)
RSV RNA NPH QL NAA+NON-PROBE: SIGNIFICANT CHANGE UP
SAO2 % BLDV: 98 % — SIGNIFICANT CHANGE UP (ref 67–88)
SARS-COV-2 RNA SPEC QL NAA+PROBE: SIGNIFICANT CHANGE UP
SODIUM SERPL-SCNC: 135 MMOL/L — SIGNIFICANT CHANGE UP (ref 135–145)
VALPROATE SERPL-MCNC: 37 UG/ML — LOW (ref 50–100)
WBC # BLD: 8.27 K/UL — SIGNIFICANT CHANGE UP (ref 3.8–10.5)
WBC # FLD AUTO: 8.27 K/UL — SIGNIFICANT CHANGE UP (ref 3.8–10.5)

## 2023-01-13 PROCEDURE — 99285 EMERGENCY DEPT VISIT HI MDM: CPT

## 2023-01-13 PROCEDURE — 95816 EEG AWAKE AND DROWSY: CPT | Mod: 26

## 2023-01-13 RX ORDER — ACETAMINOPHEN 500 MG
650 TABLET ORAL EVERY 6 HOURS
Refills: 0 | Status: DISCONTINUED | OUTPATIENT
Start: 2023-01-13 | End: 2023-01-18

## 2023-01-13 RX ORDER — LEVETIRACETAM 250 MG/1
1500 TABLET, FILM COATED ORAL
Refills: 0 | Status: DISCONTINUED | OUTPATIENT
Start: 2023-01-13 | End: 2023-01-13

## 2023-01-13 RX ORDER — GALANTAMINE HYDROBROMIDE 4 MG/1
1 TABLET, FILM COATED ORAL
Qty: 0 | Refills: 0 | DISCHARGE

## 2023-01-13 RX ORDER — LANOLIN ALCOHOL/MO/W.PET/CERES
3 CREAM (GRAM) TOPICAL AT BEDTIME
Refills: 0 | Status: DISCONTINUED | OUTPATIENT
Start: 2023-01-13 | End: 2023-01-18

## 2023-01-13 RX ORDER — EZETIMIBE 10 MG/1
1 TABLET ORAL
Qty: 0 | Refills: 0 | DISCHARGE

## 2023-01-13 RX ORDER — DIVALPROEX SODIUM 500 MG/1
250 TABLET, DELAYED RELEASE ORAL EVERY 12 HOURS
Refills: 0 | Status: DISCONTINUED | OUTPATIENT
Start: 2023-01-13 | End: 2023-01-13

## 2023-01-13 RX ORDER — LAMOTRIGINE 25 MG/1
1 TABLET, ORALLY DISINTEGRATING ORAL
Qty: 0 | Refills: 0 | DISCHARGE

## 2023-01-13 RX ORDER — DIVALPROEX SODIUM 500 MG/1
125 TABLET, DELAYED RELEASE ORAL
Refills: 0 | Status: DISCONTINUED | OUTPATIENT
Start: 2023-01-14 | End: 2023-01-14

## 2023-01-13 RX ORDER — LEVETIRACETAM 250 MG/1
1500 TABLET, FILM COATED ORAL
Refills: 0 | Status: DISCONTINUED | OUTPATIENT
Start: 2023-01-14 | End: 2023-01-14

## 2023-01-13 RX ORDER — ONDANSETRON 8 MG/1
4 TABLET, FILM COATED ORAL EVERY 8 HOURS
Refills: 0 | Status: DISCONTINUED | OUTPATIENT
Start: 2023-01-13 | End: 2023-01-18

## 2023-01-13 RX ORDER — LEVETIRACETAM 250 MG/1
1500 TABLET, FILM COATED ORAL ONCE
Refills: 0 | Status: DISCONTINUED | OUTPATIENT
Start: 2023-01-13 | End: 2023-01-13

## 2023-01-13 RX ORDER — SODIUM CHLORIDE 9 MG/ML
1000 INJECTION, SOLUTION INTRAVENOUS
Refills: 0 | Status: DISCONTINUED | OUTPATIENT
Start: 2023-01-13 | End: 2023-01-18

## 2023-01-13 RX ORDER — DIVALPROEX SODIUM 500 MG/1
250 TABLET, DELAYED RELEASE ORAL ONCE
Refills: 0 | Status: COMPLETED | OUTPATIENT
Start: 2023-01-13 | End: 2023-01-13

## 2023-01-13 RX ORDER — LAMOTRIGINE 25 MG/1
150 TABLET, ORALLY DISINTEGRATING ORAL
Refills: 0 | Status: DISCONTINUED | OUTPATIENT
Start: 2023-01-13 | End: 2023-01-18

## 2023-01-13 RX ORDER — ESCITALOPRAM OXALATE 10 MG/1
10 TABLET, FILM COATED ORAL AT BEDTIME
Refills: 0 | Status: DISCONTINUED | OUTPATIENT
Start: 2023-01-13 | End: 2023-01-18

## 2023-01-13 RX ORDER — OMEGA-3 ACID ETHYL ESTERS 1 G
1 CAPSULE ORAL
Qty: 0 | Refills: 0 | DISCHARGE

## 2023-01-13 RX ORDER — LEVETIRACETAM 250 MG/1
1500 TABLET, FILM COATED ORAL ONCE
Refills: 0 | Status: COMPLETED | OUTPATIENT
Start: 2023-01-13 | End: 2023-01-13

## 2023-01-13 RX ADMIN — LEVETIRACETAM 1500 MILLIGRAM(S): 250 TABLET, FILM COATED ORAL at 11:19

## 2023-01-13 RX ADMIN — Medication 1 MILLIGRAM(S): at 13:06

## 2023-01-13 RX ADMIN — DIVALPROEX SODIUM 250 MILLIGRAM(S): 500 TABLET, DELAYED RELEASE ORAL at 09:49

## 2023-01-13 RX ADMIN — LEVETIRACETAM 1500 MILLIGRAM(S): 250 TABLET, FILM COATED ORAL at 23:47

## 2023-01-13 RX ADMIN — SODIUM CHLORIDE 60 MILLILITER(S): 9 INJECTION, SOLUTION INTRAVENOUS at 20:19

## 2023-01-13 RX ADMIN — Medication 1 TABLET(S): at 09:48

## 2023-01-13 RX ADMIN — ESCITALOPRAM OXALATE 10 MILLIGRAM(S): 10 TABLET, FILM COATED ORAL at 22:24

## 2023-01-13 RX ADMIN — DIVALPROEX SODIUM 250 MILLIGRAM(S): 500 TABLET, DELAYED RELEASE ORAL at 01:35

## 2023-01-13 RX ADMIN — LAMOTRIGINE 150 MILLIGRAM(S): 25 TABLET, ORALLY DISINTEGRATING ORAL at 22:24

## 2023-01-13 RX ADMIN — LAMOTRIGINE 150 MILLIGRAM(S): 25 TABLET, ORALLY DISINTEGRATING ORAL at 09:48

## 2023-01-13 NOTE — PHYSICAL THERAPY INITIAL EVALUATION ADULT - GENERAL OBSERVATIONS, REHAB EVAL
Pt was found lying in bed, eyes closed, unable to follow commands seen on 1/16 for PT eval. Pt found semi supine, lethargic, however following 25% of commands, minimally verbal, difficulty keeping eyes open. Pt trans supine>sit with mod Ax1. Pt able to sit at EOB unsupported. pt trasns sit<>stand x2 with min AX2 on either side, unable to take steps at this time, returned to bed, left semi supine all needs met, RN aware. Pt would benefit from use of recliner for OOB.

## 2023-01-13 NOTE — PROGRESS NOTE ADULT - SUBJECTIVE AND OBJECTIVE BOX
CC: 81 y/o F with PMHx seizure disorder, status epilepticus, HLD, Anxiety BIBEMS for breakthrough seizure. Pt was discharged from  on 1/8 for breakthrough seizures as well, her medications were adjusted at the time. Per ED noted, pt has more more fatigued since starting depakote. They spoke to her outpatient neuro and was advised to cut dose in half but they were not able to cut the pill so they skipped her evening dose. Family noticed patient not talking and then had an episode of urinary incontinence which was similar to her previous absence seizure. She was given ativan at home.   Currently patient is confused and trying to climb out of bed to use the bathroom. She is moving all extremities. she is redirectable. She offers no complaints.     1/13 - seen this morning and she was easily arousable, knows her name, thinks it is monday, knows it is daytime - follows simple commands such as raising her hands etc but in the middle of the exam became sleepy again and exam is therefore incomplete. Later in the day - legs were tremulous per report and she was given ativan by staff - pt still lethargic, hasnt eaten anything, started on IVFs     Vital Signs Last 24 Hrs  T(C): 36.7 (13 Jan 2023 17:12), Max: 37 (13 Jan 2023 00:59)  T(F): 98 (13 Jan 2023 17:12), Max: 98.6 (13 Jan 2023 00:59)  HR: 74 (13 Jan 2023 17:12) (74 - 89)  BP: 116/50 (13 Jan 2023 17:12) (116/50 - 154/85)  BP(mean): 105 (13 Jan 2023 05:12) (105 - 105)  RR: 17 (13 Jan 2023 17:12) (17 - 18)  SpO2: 97% (13 Jan 2023 17:12) (96% - 97%)  Parameters below as of 13 Jan 2023 17:12  Patient On (Oxygen Delivery Method): room air  Constitutional: easily arousable, knows name, follows simple commands, drifts off to sleep   HEENT: PERR, EOMI  Neck: Soft and supple,  No JVD  Respiratory: Breath sounds are clear bilaterally, No wheezing, rales or rhonchi  Cardiovascular: S1 and S2, regular rate and rhythm, no Murmurs  Gastrointestinal: Bowel Sounds present, soft, nontender, nondistended  Extremities: No peripheral edema  Vascular: 2+ peripheral pulses  Neurological: A/O x 3, no focal deficits  Musculoskeletal: 5/5 strength bl UEs, LE - no focal neuro deficits but full examn not possible  Skin: No rashes    MEDICATIONS:  MEDICATIONS  (STANDING):  calcium carbonate 1250 mG  + Vitamin D (OsCal 500 + D) 1 Tablet(s) Oral daily  escitalopram 10 milliGRAM(s) Oral at bedtime  lamoTRIgine 150 milliGRAM(s) Oral two times a day  levETIRAcetam ER 1500 milliGRAM(s) Oral <User Schedule>  multivitamin 1 Tablet(s) Oral daily  sodium chloride 0.45%. 1000 milliLiter(s) (60 mL/Hr) IV Continuous <Continuous>      LABS: All Labs Reviewed:                      12.1   8.27  )-----------( 161      ( 13 Jan 2023 01:27 )             36.5   135  |  99  |  10  ----------------------------<  100<H>  3.8   |  31  |  0.71    RADIOLOGY/EKG:  < from: CT Head No Cont (01.05.23 @ 21:18) >  Stable exam. No acute intracranial hemorrhage, vasogenic edema,   extra-axial collection or hydrocephalus. Chronic microvascular changes as   above.      EEG 1/13/2023  No focal, lateralized or epileptiform features were present.   No seizure sequences occurred.   A longer study including a period of sleep may be considered, if clinically indicated, which may increase the yield of epileptiform features..   This is an abnormal EEG consistent with moderate generalized diffuse cerebral dysfunction.   This recording is consistent with metabolic derangement.

## 2023-01-13 NOTE — CONSULT NOTE ADULT - ASSESSMENT
81 y/o F with PMHx HLD, Anxiety, seizure disorder/status epilepticus, recently admitted to  1/5/22 for recurrent seizure - 4 in a row, ? status epilepticus, seen by Dr. Shah, routine EEG did no evidence of SE, prior EEGs had shown S/W, she was monitored on video EEG, low dose Depakote was added to Keppra and lamotrigine, 24 HR EEG showed no epileptiform abnormalities or seizures. Pt was d/c home on 1/8/22 on Depakote 250 mg q12, Keppra 1500 mg bid and Lamotrigine 150 mg BID, NH3 - < 10.    As per her daughter who provides history, pt remained well for few days, they spoke with their neurologist Dr. Barrett, he advised EEG monitoring at [facility] that was scheduled for coming tuesday, in the interim, pt was noted to be more fatigued, was attributed to depakote, they consulted neurologist again, he advised to cut dose in half but they were not able to cut the pill so they skipped her evening dose. Daughter noticed patient was not talking as usual, had an episode of urinary incontinence which was similar to one of her previous absence seizure, was given ativan at home and brought to ED.    On exam, pt is alert, appropriate, responds to questions, denies HA, no seizures or shaking movements seen.    # Possible breakthro' seizure vs encephalopathy - related to Depakote use    - I have recommended lowering Depakote to 125 mg bid, current VPA level is 37  - Obtain routine EEG, if no epileptiform activity, no further monitoring is necessary  - Continue Keppra 1500 mg bid and Lamotrigine 150 mg BID.      Above D/W Pt's daughter and Dr. Field

## 2023-01-13 NOTE — ED PROVIDER NOTE - PHYSICAL EXAMINATION

## 2023-01-13 NOTE — ED ADULT TRIAGE NOTE - CHIEF COMPLAINT QUOTE
Pt bibems from home. As per ems "pt has gone down hill since 2300". Pt was here for seizures last sunday. Today pat received Depakote  this am but not night dose. Ativan at 1850, and keppra @2030. Pt A&ox1, which is not her baseline.  at bedside. pt placed close to nurses station. VS stable.

## 2023-01-13 NOTE — ED PROVIDER NOTE - NS ED ROS FT
Constitutional: No fever or chills  Eyes: No visual changes  HEENT: No throat pain  CV: No chest pain  Resp: No SOB no cough  GI: No abd pain, nausea or vomiting  : No dysuria  MSK: No musculoskeletal pain  Skin: No rash  Neuro: No headache. + seizure

## 2023-01-13 NOTE — ED PROVIDER NOTE - OBJECTIVE STATEMENT
82-year-old female with history of seizure disorder presents with breakthrough seizure.  Patient was recently here and discharged after being cared for for multiple episodes of breakthrough seizure.  Family was a bit concerned because she has been a bit sleepy since starting Depakote.  Outpatient neurologist recommended cutting the dose in half from 250 mg twice a day to 125 mg twice a day.  patient received her normal 250 mg dose in the morning and then in the evening family noted that they could not cut the pill in half as it was a capsule.  Instead they skipped that dose thinking the morning dose would be sufficient.  They then noted a change in mental status with less talking and patient had an episode of incontinence.  They gave her 1 mg of Ativan which seemed to improve her symptoms.  In the ambulance she stated her name and here in the emergency department she is back at her baseline per family.  seizure activity was described as absence seizure like this similar to her recent tonic-clonic seizures  however family states that she has had these types of seizures in the past.

## 2023-01-13 NOTE — PROGRESS NOTE ADULT - ASSESSMENT
83 y/o F with PMHx seizure disorder, status epilepticus, HLD, Anxiety here with:    #breakthrough seizure  - place in observation on med-surg  - continue keppra 1500 mg BID  - got depakote 250mg this morning - REDUCE DOSE TO DEPAKOTE 125mg BID starting tmr   - continue lamotrigine 150mg BID  - ativan PRN for seizure activity  - neuro checks; seizure precautions; elevate HOB  - neuro consult -  recent 24 hr EEG Mild diffuse/multi-focal cerebral dysfunction, not specific as to etiology.   There were no epileptiform abnormalities or seizures recorded x 1 day.    - EEG today NEG FOR EPIL ACTIVITY  - if no further acute events, wait for patient's lethargy to improve from the ativan, get PT eval tmr and plan to dc home tmr - POC discussed with daughter and Neurology   - if patient is discharged tmr, patient has an appointment to the Neuro service at [facility] for tuesday and needs COVID PCR 1/14/22- ordered  - if patient does not improve daughter Leatha is interested in a tf to [facility] - advised her that she will need to provide the name of an accepting hospitalist in order to coordinate this.    #HLD  - continue home meds    #cognitive impairment  - on galantamine at home    #DVT prophylaxis  - SCDs

## 2023-01-13 NOTE — H&P ADULT - NSHPPHYSICALEXAM_GEN_ALL_CORE
Vital Signs Last 24 Hrs  T(C): 36.4 (13 Jan 2023 05:12), Max: 37 (13 Jan 2023 00:59)  T(F): 97.5 (13 Jan 2023 05:12), Max: 98.6 (13 Jan 2023 00:59)  HR: 76 (13 Jan 2023 05:12) (76 - 89)  BP: 154/85 (13 Jan 2023 05:12) (118/68 - 154/85)  BP(mean): 105 (13 Jan 2023 05:12) (105 - 105)  RR: 17 (13 Jan 2023 05:12) (17 - 18)  SpO2: 96% (13 Jan 2023 05:12) (96% - 96%)    Parameters below as of 13 Jan 2023 05:12  Patient On (Oxygen Delivery Method): room air

## 2023-01-13 NOTE — ED PROVIDER NOTE - CLINICAL SUMMARY MEDICAL DECISION MAKING FREE TEXT BOX
82-year-old female with breakthrough seizure after not getting her usual evening dose of Depakote.  Improved status post Ativan.  Will obtain labs here including Depakote level and give evening dose of Depakote.  Seizure precautions.  Admission for neurology evaluation and monitoring.

## 2023-01-13 NOTE — CONSULT NOTE ADULT - SUBJECTIVE AND OBJECTIVE BOX
CC: 82y old  Female who presents with a chief complaint of seizure     HPI:  81 y/o F with PMHx HLD, Anxiety, seizure disorder/status epilepticus, recently admitted to  1/6/22 for recurrent seizure - 4 in a row, ? status epilepticus, seen by Dr. Shah, routine EEG did not show evidence of SE, prior EEGs had shown S/W, she was monitored on video EEG, low dose Depakote was added to Keppra and lamotrigine, 24 HR EEG showed no epileptiform abnormalities or seizures. Pt was d/c home on 1/8/22 on Depakote 250 mg q12, Keppra 1500 mg bid and Lamotrigine 150 mg BID, NH3 - < 10.    As per her daughter who provides history, pt remained well for few days, they spoke with their neurologist Dr. Barrett, he advised EEG monitoring at [facility] that was scheduled for coming tuesday, in the interim, pt was noted to be more fatigued, was attributed to depakote, they consulted neurologist again, he advised to cut dose in half but they were not able to cut the pill so they skipped her evening dose. Daughter noticed patient was not talking as usual, had an episode of urinary incontinence which was similar to one of her previous absence seizure, was given ativan at home and brought to ED.    On exam, pt is alert, appropriate, responds to questions, denies HA, no seizures or shaking movements seen.      PAST MEDICAL & SURGICAL HISTORY:  Seizure disorder  Hyperlipidemia  Anxiety disorder  Total knee replacement status right      FAMILY HISTORY:  Unable to obtain      Social Hx:  Nonsmoker, no drug or alcohol use      MEDICATIONS  (STANDING):  calcium carbonate 1250 mG  + Vitamin D (OsCal 500 + D) 1 Tablet(s) Oral daily  escitalopram 10 milliGRAM(s) Oral at bedtime  lamoTRIgine 150 milliGRAM(s) Oral two times a day  levETIRAcetam ER 1500 milliGRAM(s) Oral <User Schedule>  multivitamin 1 Tablet(s) Oral daily       Allergies  No Known Allergies      ROS: Pertinent positives in HPI, all other ROS were reviewed and are negative.        Vital Signs Last 24 Hrs  T(C): 36.6 (13 Jan 2023 08:12), Max: 37 (13 Jan 2023 00:59)  T(F): 97.9 (13 Jan 2023 08:12), Max: 98.6 (13 Jan 2023 00:59)  HR: 75 (13 Jan 2023 08:12) (75 - 89)  BP: 149/81 (13 Jan 2023 08:12) (118/68 - 154/85)  BP(mean): 105 (13 Jan 2023 05:12) (105 - 105)  RR: 17 (13 Jan 2023 08:12) (17 - 18)  SpO2: 96% (13 Jan 2023 08:12) (96% - 96%)    Parameters below as of 13 Jan 2023 08:12  Patient On (Oxygen Delivery Method): room air      Gen exam:  Normocephalic, in no distress, awake and alert.  HEENT: PERRLA, EOMI,   Neck: Supple.  Respiratory: Breath sounds are clear bilaterally  Cardiovascular: S1 and S2, regular  Extremities:  no edema  Vascular: Carotid Bruit - no  Musculoskeletal: no abnormal movements  Skin: No rashes      Neurological exam:  HF: Awake and alert. Oriented to person. Not oriented to place or month. Able to state year.     CN: NINO, EOMI, VFF, facial sensation normal, no NLFD, tongue midline  Motor: No pronator drift, moves 4 ext antigravity.    Sens: Intact to light touch   Reflexes: Symmetric and normal. downgoing toes b/l  Coord:  No FNFA, dysmetria   Gait/Balance: Not tested        Labs:   01-13    135  |  99  |  10  ----------------------------<  100<H>  3.8   |  31  |  0.71    Ca    9.2      13 Jan 2023 01:27    TPro  6.3  /  Alb  3.3  /  TBili  0.5  /  DBili  x   /  AST  23  /  ALT  24  /  AlkPhos  57  01-13                              12.1   8.27  )-----------( 161      ( 13 Jan 2023 01:27 )             36.5         1/6/23- 1/7/23:   EEG Classification / Summary:  Abnormal EEG study  Mild generalized background slowing    -----------------------------------------------------------------------------------------------------    Clinical Impression:  Mild diffuse/multi-focal cerebral dysfunction, not specific as to etiology.  There were no epileptiform abnormalities or seizures recorded x 1 day.    Substantial left hemispheric lead artifacts in latter portions of the record limiting interpretation  -------------------------------------------------------------------------------------------------------  [facility] EEG Reading Room Ph#: [phone]  Epilepsy Answering Service after 5PM and before 8:30AM: Ph#: [phone]    Harish Zepeda M.D.   of Neurology, [facility]

## 2023-01-13 NOTE — ED ADULT NURSE NOTE - OBJECTIVE STATEMENT
Pt BIBEMS from home c/o altered mental status. Pt was recently dced from  for new onset seizures. Family at bedside states since 11pm "pt has gone downhill". Pt normally a&ox4, ambulatory and continent. Pt is now a&ox1-2, unable to ambulate and incontinent. Pt received scheduled medications of ativan at 1850 and keppra at 2030. Pt lethargic, but responsive to verbal commands. Family at bedside. Pt placed close to nurses station for safety.

## 2023-01-13 NOTE — PHYSICAL THERAPY INITIAL EVALUATION ADULT - DIAGNOSIS, PT EVAL
PMHx seizure disorder, status epilepticus, recent 24 hr EEG Mild diffuse/multi-focal cerebral dysfunction, PMHx seizure disorder, status epilepticus, recent 24 hr EEG Mild diffuse/multi-focal cerebral dysfunction

## 2023-01-13 NOTE — ED ADULT NURSE NOTE - HISTORY OF COVID-19 VACCINATION
Influenza injection given in right deltoid.  Patient tolerated without incident. See MAR for documentation.    
Vaccine status unknown

## 2023-01-13 NOTE — H&P ADULT - ASSESSMENT
81 y/o F with PMHx seizure disorder, status epilepticus, HLD, Anxiety here with:    #breakthrough seizure  - place in observation on med-surg  - continue keppra 1500 mg BID  - continue depakote 250mg BID  - continue lamotrigine 150mg BID  - ativan PRN for seizure activity  - neuro checks  - seizure precautions  - neuro consult  - PT eval  - recent 24 hr EEG Mild diffuse/multi-focal cerebral dysfunction, not specific as to etiology. There were no epileptiform abnormalities or seizures recorded x 1 day.    - recent CT head negative    #HLD  - continue home meds    #cognitive impairment  - on galantamine at home    #DVT prophylaxis  - SCDs

## 2023-01-13 NOTE — PHARMACOTHERAPY INTERVENTION NOTE - COMMENTS
Medication reconciliation completed.  Pt recently dc'd from  on 1-8-23, current medication list taken from Pt Discharge Paperwork; confirmed with Dr. First [facility].

## 2023-01-13 NOTE — PHYSICAL THERAPY INITIAL EVALUATION ADULT - PERTINENT HX OF CURRENT PROBLEM, REHAB EVAL
81 y/o F with PMHx seizure disorder, status epilepticus, HLD, Anxiety BIBEMS for breakthrough seizure. Pt was discharged from  on 1/8 for breakthrough seizures as well, her medications were adjusted at the time. Per ED noted, pt has more more fatigued since starting depakote. They spoke to her outpatient neuro and was advised to cut dose in half but they were not able to cut the pill so they skipped her evening dose. Family noticed patient not talking and then had an episode of urinary incontinence which was similar to her previous absence seizure. She was given ativan at home. In the morning patient was confused and trying to climb out of bed to use the bathroom.

## 2023-01-13 NOTE — H&P ADULT - HISTORY OF PRESENT ILLNESS
83 y/o F with PMHx seizure disorder, status epilepticus, HLD, Anxiety, 81 y/o F with PMHx seizure disorder, status epilepticus, HLD, Anxiety BIBEMS for breakthrough seizure. Pt was discharged from  on 1/8 for breakthrough seizures as well, her medications were adjusted at the time. Per ED noted, pt has more more fatigued since starting depakote. They spoke to her outpatient neuro and was advised to cut dose in half but they were not able to cut the pill so they skipped her evening dose. Family noticed patient not talking and then had an episode of urinary incontinence which was similar to her previous absence seizure. She was given ativan at home.   Currently patient is confused and trying to climb out of bed to use the bathroom. She is moving all extremities. she is redirectable. She offers no complaints.

## 2023-01-14 DIAGNOSIS — R56.9 UNSPECIFIED CONVULSIONS: ICD-10-CM

## 2023-01-14 LAB
ANION GAP SERPL CALC-SCNC: 6 MMOL/L — SIGNIFICANT CHANGE UP (ref 5–17)
BASOPHILS # BLD AUTO: 0.03 K/UL — SIGNIFICANT CHANGE UP (ref 0–0.2)
BASOPHILS NFR BLD AUTO: 0.5 % — SIGNIFICANT CHANGE UP (ref 0–2)
BUN SERPL-MCNC: 11 MG/DL — SIGNIFICANT CHANGE UP (ref 7–23)
CALCIUM SERPL-MCNC: 8.6 MG/DL — SIGNIFICANT CHANGE UP (ref 8.5–10.1)
CHLORIDE SERPL-SCNC: 99 MMOL/L — SIGNIFICANT CHANGE UP (ref 96–108)
CO2 SERPL-SCNC: 27 MMOL/L — SIGNIFICANT CHANGE UP (ref 22–31)
CREAT SERPL-MCNC: 0.64 MG/DL — SIGNIFICANT CHANGE UP (ref 0.5–1.3)
EGFR: 89 ML/MIN/1.73M2 — SIGNIFICANT CHANGE UP
EOSINOPHIL # BLD AUTO: 0.09 K/UL — SIGNIFICANT CHANGE UP (ref 0–0.5)
EOSINOPHIL NFR BLD AUTO: 1.4 % — SIGNIFICANT CHANGE UP (ref 0–6)
GLUCOSE SERPL-MCNC: 86 MG/DL — SIGNIFICANT CHANGE UP (ref 70–99)
HCT VFR BLD CALC: 35.8 % — SIGNIFICANT CHANGE UP (ref 34.5–45)
HGB BLD-MCNC: 12.3 G/DL — SIGNIFICANT CHANGE UP (ref 11.5–15.5)
IMM GRANULOCYTES NFR BLD AUTO: 0.5 % — SIGNIFICANT CHANGE UP (ref 0–0.9)
LYMPHOCYTES # BLD AUTO: 1.06 K/UL — SIGNIFICANT CHANGE UP (ref 1–3.3)
LYMPHOCYTES # BLD AUTO: 15.9 % — SIGNIFICANT CHANGE UP (ref 13–44)
MCHC RBC-ENTMCNC: 32.5 PG — SIGNIFICANT CHANGE UP (ref 27–34)
MCHC RBC-ENTMCNC: 34.4 GM/DL — SIGNIFICANT CHANGE UP (ref 32–36)
MCV RBC AUTO: 94.5 FL — SIGNIFICANT CHANGE UP (ref 80–100)
MONOCYTES # BLD AUTO: 0.66 K/UL — SIGNIFICANT CHANGE UP (ref 0–0.9)
MONOCYTES NFR BLD AUTO: 9.9 % — SIGNIFICANT CHANGE UP (ref 2–14)
NEUTROPHILS # BLD AUTO: 4.78 K/UL — SIGNIFICANT CHANGE UP (ref 1.8–7.4)
NEUTROPHILS NFR BLD AUTO: 71.8 % — SIGNIFICANT CHANGE UP (ref 43–77)
PLATELET # BLD AUTO: 175 K/UL — SIGNIFICANT CHANGE UP (ref 150–400)
POTASSIUM SERPL-MCNC: 4 MMOL/L — SIGNIFICANT CHANGE UP (ref 3.5–5.3)
POTASSIUM SERPL-SCNC: 4 MMOL/L — SIGNIFICANT CHANGE UP (ref 3.5–5.3)
RBC # BLD: 3.79 M/UL — LOW (ref 3.8–5.2)
RBC # FLD: 12.2 % — SIGNIFICANT CHANGE UP (ref 10.3–14.5)
SARS-COV-2 RNA SPEC QL NAA+PROBE: SIGNIFICANT CHANGE UP
SODIUM SERPL-SCNC: 132 MMOL/L — LOW (ref 135–145)
WBC # BLD: 6.65 K/UL — SIGNIFICANT CHANGE UP (ref 3.8–10.5)
WBC # FLD AUTO: 6.65 K/UL — SIGNIFICANT CHANGE UP (ref 3.8–10.5)

## 2023-01-14 PROCEDURE — 73110 X-RAY EXAM OF WRIST: CPT | Mod: RT

## 2023-01-14 PROCEDURE — 87635 SARS-COV-2 COVID-19 AMP PRB: CPT

## 2023-01-14 PROCEDURE — 97163 PT EVAL HIGH COMPLEX 45 MIN: CPT | Mod: GP

## 2023-01-14 PROCEDURE — 36415 COLL VENOUS BLD VENIPUNCTURE: CPT

## 2023-01-14 PROCEDURE — 97116 GAIT TRAINING THERAPY: CPT | Mod: GP

## 2023-01-14 PROCEDURE — 80048 BASIC METABOLIC PNL TOTAL CA: CPT

## 2023-01-14 PROCEDURE — 95714 VEEG EA 12-26 HR UNMNTR: CPT

## 2023-01-14 PROCEDURE — 85025 COMPLETE CBC W/AUTO DIFF WBC: CPT

## 2023-01-14 PROCEDURE — 99233 SBSQ HOSP IP/OBS HIGH 50: CPT

## 2023-01-14 PROCEDURE — 97530 THERAPEUTIC ACTIVITIES: CPT | Mod: GP

## 2023-01-14 RX ORDER — VALPROIC ACID (AS SODIUM SALT) 250 MG/5ML
125 SOLUTION, ORAL ORAL
Refills: 0 | Status: DISCONTINUED | OUTPATIENT
Start: 2023-01-14 | End: 2023-01-15

## 2023-01-14 RX ORDER — LEVETIRACETAM 250 MG/1
1000 TABLET, FILM COATED ORAL EVERY 12 HOURS
Refills: 0 | Status: DISCONTINUED | OUTPATIENT
Start: 2023-01-14 | End: 2023-01-17

## 2023-01-14 RX ORDER — LEVETIRACETAM 250 MG/1
1500 TABLET, FILM COATED ORAL EVERY 12 HOURS
Refills: 0 | Status: DISCONTINUED | OUTPATIENT
Start: 2023-01-14 | End: 2023-01-14

## 2023-01-14 RX ORDER — DEXTROSE MONOHYDRATE, SODIUM CHLORIDE, AND POTASSIUM CHLORIDE 50; .745; 4.5 G/1000ML; G/1000ML; G/1000ML
1000 INJECTION, SOLUTION INTRAVENOUS
Refills: 0 | Status: DISCONTINUED | OUTPATIENT
Start: 2023-01-14 | End: 2023-01-18

## 2023-01-14 RX ADMIN — Medication 1 TABLET(S): at 12:36

## 2023-01-14 RX ADMIN — Medication 51.25 MILLIGRAM(S): at 14:39

## 2023-01-14 RX ADMIN — LEVETIRACETAM 400 MILLIGRAM(S): 250 TABLET, FILM COATED ORAL at 14:15

## 2023-01-14 RX ADMIN — Medication 1 TABLET(S): at 12:28

## 2023-01-14 RX ADMIN — DEXTROSE MONOHYDRATE, SODIUM CHLORIDE, AND POTASSIUM CHLORIDE 70 MILLILITER(S): 50; .745; 4.5 INJECTION, SOLUTION INTRAVENOUS at 16:44

## 2023-01-14 NOTE — PATIENT PROFILE ADULT - FALL HARM RISK - HARM RISK INTERVENTIONS
Assistance with ambulation/Assistance OOB with selected safe patient handling equipment/Communicate Risk of Fall with Harm to all staff/Discuss with provider need for PT consult/Monitor gait and stability/Reinforce activity limits and safety measures with patient and family/Tailored Fall Risk Interventions/Visual Cue: Yellow wristband and red socks/Bed in lowest position, wheels locked, appropriate side rails in place/Call bell, personal items and telephone in reach/Instruct patient to call for assistance before getting out of bed or chair/Non-slip footwear when patient is out of bed/[place] to call system/Physically safe environment - no spills, clutter or unnecessary equipment/Purposeful Proactive Rounding/Room/bathroom lighting operational, light cord in reach

## 2023-01-14 NOTE — PATIENT PROFILE ADULT - FUNCTIONAL ASSESSMENT - BASIC MOBILITY 6.
2-calculated by average/Not able to assess (calculate score using [facility] averaging method)

## 2023-01-14 NOTE — PROGRESS NOTE ADULT - SUBJECTIVE AND OBJECTIVE BOX
CC: 83 y/o F with PMHx seizure disorder, status epilepticus, HLD, Anxiety BIBEMS for breakthrough seizure. Pt was discharged from  on 1/8 for breakthrough seizures as well, her medications were adjusted at the time. Per ED noted, pt has more more fatigued since starting depakote. They spoke to her outpatient neuro and was advised to cut dose in half but they were not able to cut the pill so they skipped her evening dose. Family noticed patient not talking and then had an episode of urinary incontinence which was similar to her previous absence seizure. She was given ativan at home.   Currently patient is confused and trying to climb out of bed to use the bathroom. She is moving all extremities. she is redirectable. She offers no complaints.     1/13 - seen this morning and she was easily arousable, knows her name, thinks it is monday, knows it is daytime - follows simple commands such as raising her hands etc but in the middle of the exam became sleepy again and exam is therefore incomplete. Later in the day - legs were tremulous per report and she was given ativan by staff - pt still lethargic, hasnt eaten anything, started on IVFs   Constitutional: easily arousable, knows name, follows simple commands, drifts off to sleep   HEENT: PERR, EOMI  Neck: Soft and supple,  No JVD  Respiratory: Breath sounds are clear bilaterally, No wheezing, rales or rhonchi  Cardiovascular: S1 and S2, regular rate and rhythm, no Murmurs  Gastrointestinal: Bowel Sounds present, soft, nontender, nondistended  Extremities: No peripheral edema  Vascular: 2+ peripheral pulses  Neurological: A/O x 3, no focal deficits  Musculoskeletal: 5/5 strength bl UEs, LE - no focal neuro deficits but full examn not possible  Skin: No rashesRADIOLOGY/EKG:  < from: CT Head No Cont (01.05.23 @ 21:18) >  Stable exam. No acute intracranial hemorrhage, vasogenic edema,   extra-axial collection or hydrocephalus. Chronic microvascular changes as   above.      EEG 1/13/2023  No focal, lateralized or epileptiform features were present.   No seizure sequences occurred.   A longer study including a period of sleep may be considered, if clinically indicated, which may increase the yield of epileptiform features..   This is an abnormal EEG consistent with moderate generalized diffuse cerebral dysfunction.   This recording is consistent with metabolic derangement.     CC: 83 y/o F with PMHx seizure disorder, status epilepticus, HLD, Anxiety BIBEMS for breakthrough seizure. Pt was discharged from  on 1/8 for breakthrough seizures as well, her medications were adjusted at the time. Per ED noted, pt has more more fatigued since starting depakote. They spoke to her outpatient neuro and was advised to cut dose in half but they were not able to cut the pill so they skipped her evening dose. Family noticed patient not talking and then had an episode of urinary incontinence which was similar to her previous absence seizure. She was given ativan at home.   Currently patient is confused and trying to climb out of bed to use the bathroom. She is moving all extremities. she is redirectable. She offers no complaints.     1/13 - seen this morning and she was easily arousable, knows her name, thinks it is monday, knows it is daytime - follows simple commands such as raising her hands etc but in the middle of the exam became sleepy again and exam is therefore incomplete. Later in the day - legs were tremulous per report and she was given ativan by staff - pt still lethargic, hasnt eaten anything, started on IVFs   1/14 pt remains lethargic, opens eyes  and says" yes " intermittently to all questions     PHYSICAL EXAM:    Daily     Daily     ICU Vital Signs Last 24 Hrs  T(C): 36.3 (14 Jan 2023 07:31), Max: 36.9 (13 Jan 2023 20:25)  T(F): 97.3 (14 Jan 2023 07:31), Max: 98.4 (13 Jan 2023 20:25)  HR: 83 (14 Jan 2023 07:31) (74 - 83)  BP: 128/69 (14 Jan 2023 07:31) (116/50 - 128/69)  BP(mean): --  ABP: --  ABP(mean): --  RR: 16 (14 Jan 2023 07:31) (16 - 18)  SpO2: 96% (14 Jan 2023 07:31) (96% - 97%)    O2 Parameters below as of 14 Jan 2023 07:31  Patient On (Oxygen Delivery Method): room air  Constitutional: lethargic   HEENT: PERR, EOMI  Neck: Soft and supple,  No JVD  Respiratory: Breath sounds are clear bilaterally, No wheezing, rales or rhonchi  Cardiovascular: S1 and S2, regular rate and rhythm, no Murmurs  Gastrointestinal: Bowel Sounds present, soft, nontender, nondistended  Extremities: No peripheral edema  Vascular: 2+ peripheral pulses  Neurological: lethargic , does not follow commands   Skin: No rashes                                    12.1   8.27  )-----------( 161      ( 13 Jan 2023 01:27 )             36.5       CBC Full  -  ( 13 Jan 2023 01:27 )  WBC Count : 8.27 K/uL  RBC Count : 3.79 M/uL  Hemoglobin : 12.1 g/dL  Hematocrit : 36.5 %  Platelet Count - Automated : 161 K/uL  Mean Cell Volume : 96.3 fl  Mean Cell Hemoglobin : 31.9 pg  Mean Cell Hemoglobin Concentration : 33.2 gm/dL  Auto Neutrophil # : 6.12 K/uL  Auto Lymphocyte # : 1.02 K/uL  Auto Monocyte # : 0.97 K/uL  Auto Eosinophil # : 0.12 K/uL  Auto Basophil # : 0.02 K/uL  Auto Neutrophil % : 74.1 %  Auto Lymphocyte % : 12.3 %  Auto Monocyte % : 11.7 %  Auto Eosinophil % : 1.5 %  Auto Basophil % : 0.2 %      01-13    135  |  99  |  10  ----------------------------<  100<H>  3.8   |  31  |  0.71    Ca    9.2      13 Jan 2023 01:27    TPro  6.3  /  Alb  3.3  /  TBili  0.5  /  DBili  x   /  AST  23  /  ALT  24  /  AlkPhos  57  01-13      LIVER FUNCTIONS - ( 13 Jan 2023 01:27 )  Alb: 3.3 g/dL / Pro: 6.3 gm/dL / ALK PHOS: 57 U/L / ALT: 24 U/L / AST: 23 U/L / GGT: x                               MEDICATIONS  (STANDING):  calcium carbonate 1250 mG  + Vitamin D (OsCal 500 + D) 1 Tablet(s) Oral daily  escitalopram 10 milliGRAM(s) Oral at bedtime  lamoTRIgine 150 milliGRAM(s) Oral two times a day  levETIRAcetam  IVPB 1500 milliGRAM(s) IV Intermittent every 12 hours  multivitamin 1 Tablet(s) Oral daily  sodium chloride 0.45%. 1000 milliLiter(s) (60 mL/Hr) IV Continuous <Continuous>  valproate sodium  IVPB 125 milliGRAM(s) IV Intermittent two times a day                  RADIOLOGY/EKG:  < from: CT Head No Cont (01.05.23 @ 21:18) >  Stable exam. No acute intracranial hemorrhage, vasogenic edema,   extra-axial collection or hydrocephalus. Chronic microvascular changes as   above.      EEG 1/13/2023  No focal, lateralized or epileptiform features were present.   No seizure sequences occurred.   A longer study including a period of sleep may be considered, if clinically indicated, which may increase the yield of epileptiform features..   This is an abnormal EEG consistent with moderate generalized diffuse cerebral dysfunction.   This recording is consistent with metabolic derangement.

## 2023-01-14 NOTE — PROGRESS NOTE ADULT - ASSESSMENT
81 y/o F with PMHx seizure disorder, status epilepticus, HLD, Anxiety here with:    #breakthrough seizure  - place in observation on med-surg  - continue keppra 1500 mg BID  - got depakote 250mg this morning - REDUCE DOSE TO DEPAKOTE 125mg BID starting tmr   - continue lamotrigine 150mg BID  - ativan PRN for seizure activity  - neuro checks; seizure precautions; elevate HOB  - neuro consult -  recent 24 hr EEG Mild diffuse/multi-focal cerebral dysfunction, not specific as to etiology.   There were no epileptiform abnormalities or seizures recorded x 1 day.    - EEG today NEG FOR EPIL ACTIVITY  - if no further acute events, wait for patient's lethargy to improve from the ativan, get PT eval tmr and plan to dc home tmr - POC discussed with daughter and Neurology   - if patient is discharged tmr, patient has an appointment to the Neuro service at [facility] for tuesday and needs COVID PCR 1/14/22- ordered  - if patient does not improve daughter Leahta is interested in a tf to [facility] - advised her that she will need to provide the name of an accepting hospitalist in order to coordinate this.      #HLD  - continue home meds    #cognitive impairment  - on galantamine at home    #DVT prophylaxis  - SCDs   83 y/o F with PMHx seizure disorder, status epilepticus, HLD, Anxiety here with:    #breakthrough seizure  #  Acute encephalopathy - related to Depakote use vs post ictal vs metabolic encephalopathy  Depakote to 125 mg bid,switched to IV   current VPA level is 37   routine EEG, if no epileptiform activity,  -switched po to IV  Keppra 1500 mg bid and Lamotrigine 150 mg BID.  - continue keppra 1500 mg BID  - REDUCEd DOSE TO DEPAKOTE 125mg BID per neuro  Plan for VEEG I dw with Dr Delarosa  MRI brain   clinically no signs of active infection , VBG noted -no hypercarbic acidosis  - ativan PRN for seizure activity  - neuro checks; seizure precautions; elevate HOB  - neuro consult -  recent 24 hr EEG Mild diffuse/multi-focal cerebral dysfunction, not specific as to etiology.   There were no epileptiform abnormalities or seizures recorded x 1 day.    - EEG today 1/13 FOR EPIL ACTIVITY  if no improvement in mentation in next 24 hours would need corepack for meds- explained to daughter at bedside  IVF for today  patient has an appointment to the Neuro service at [facility] for tuesday    daughter Leatha is interested in a tf to [facility] - advised her that she will need to find  accepting physician at facility who will initiate transfer      #   #HLD  - continue home meds    #cognitive impairment  - on galantamine at home    #DVT prophylaxis  - SCDs   81 y/o F with PMHx seizure disorder, status epilepticus, HLD, Anxiety here with:    #breakthrough seizure  #  Acute encephalopathy - related to Depakote use vs post ictal vs metabolic encephalopathy  Depakote to 125 mg bid,switched to IV   current VPA level is 37   routine EEG, if no epileptiform activity,  -switched po to IV  Keppra 1500 mg bid and Lamotrigine 150 mg BID.  - continue keppra 1500 mg BID  - REDUCEd DOSE TO DEPAKOTE 125mg BID per neuro  Plan for VEEG I dw with Dr Delarosa  CT head, CXR  clinically no obvious signs of active infection , VBG noted -no hypercarbic acidosis  - ativan PRN for seizure activity  - neuro checks; seizure precautions; elevate HOB  - neuro consult -  recent 24 hr EEG Mild diffuse/multi-focal cerebral dysfunction, not specific as to etiology.   There were no epileptiform abnormalities or seizures recorded x 1 day.    - EEG today 1/13 FOR EPIL ACTIVITY  if no improvement in mentation in next 24 hours would need corepack for meds- explained to daughter at bedside  IVF for today  patient has an appointment to the Neuro service at [facility] for tuesday    daughter Leatha is interested in a tf to [facility] - advised her that she will need to find  accepting physician at facility who will initiate transfer      #   #HLD  - continue home meds    #cognitive impairment  - on galantamine at home    #DVT prophylaxis  - SCDs

## 2023-01-15 LAB
ANION GAP SERPL CALC-SCNC: 7 MMOL/L — SIGNIFICANT CHANGE UP (ref 5–17)
BUN SERPL-MCNC: 6 MG/DL — LOW (ref 7–23)
CALCIUM SERPL-MCNC: 8.8 MG/DL — SIGNIFICANT CHANGE UP (ref 8.5–10.1)
CHLORIDE SERPL-SCNC: 101 MMOL/L — SIGNIFICANT CHANGE UP (ref 96–108)
CO2 SERPL-SCNC: 25 MMOL/L — SIGNIFICANT CHANGE UP (ref 22–31)
CREAT SERPL-MCNC: 0.41 MG/DL — LOW (ref 0.5–1.3)
EGFR: 99 ML/MIN/1.73M2 — SIGNIFICANT CHANGE UP
GLUCOSE SERPL-MCNC: 124 MG/DL — HIGH (ref 70–99)
POTASSIUM SERPL-MCNC: 3.7 MMOL/L — SIGNIFICANT CHANGE UP (ref 3.5–5.3)
POTASSIUM SERPL-SCNC: 3.7 MMOL/L — SIGNIFICANT CHANGE UP (ref 3.5–5.3)
SODIUM SERPL-SCNC: 133 MMOL/L — LOW (ref 135–145)

## 2023-01-15 PROCEDURE — 99233 SBSQ HOSP IP/OBS HIGH 50: CPT

## 2023-01-15 PROCEDURE — 73110 X-RAY EXAM OF WRIST: CPT | Mod: 26,RT

## 2023-01-15 PROCEDURE — 99232 SBSQ HOSP IP/OBS MODERATE 35: CPT

## 2023-01-15 PROCEDURE — 95720 EEG PHY/QHP EA INCR W/VEEG: CPT

## 2023-01-15 RX ORDER — VALPROIC ACID (AS SODIUM SALT) 250 MG/5ML
500 SOLUTION, ORAL ORAL ONCE
Refills: 0 | Status: COMPLETED | OUTPATIENT
Start: 2023-01-15 | End: 2023-01-15

## 2023-01-15 RX ORDER — KETOROLAC TROMETHAMINE 30 MG/ML
15 SYRINGE (ML) INJECTION EVERY 12 HOURS
Refills: 0 | Status: DISCONTINUED | OUTPATIENT
Start: 2023-01-15 | End: 2023-01-18

## 2023-01-15 RX ORDER — VALPROIC ACID (AS SODIUM SALT) 250 MG/5ML
250 SOLUTION, ORAL ORAL EVERY 12 HOURS
Refills: 0 | Status: DISCONTINUED | OUTPATIENT
Start: 2023-01-15 | End: 2023-01-17

## 2023-01-15 RX ADMIN — DEXTROSE MONOHYDRATE, SODIUM CHLORIDE, AND POTASSIUM CHLORIDE 70 MILLILITER(S): 50; .745; 4.5 INJECTION, SOLUTION INTRAVENOUS at 10:03

## 2023-01-15 RX ADMIN — Medication 55 MILLIGRAM(S): at 16:41

## 2023-01-15 RX ADMIN — LEVETIRACETAM 400 MILLIGRAM(S): 250 TABLET, FILM COATED ORAL at 15:20

## 2023-01-15 RX ADMIN — Medication 51.25 MILLIGRAM(S): at 02:55

## 2023-01-15 RX ADMIN — Medication 15 MILLIGRAM(S): at 16:41

## 2023-01-15 RX ADMIN — ESCITALOPRAM OXALATE 10 MILLIGRAM(S): 10 TABLET, FILM COATED ORAL at 21:35

## 2023-01-15 RX ADMIN — Medication 51.25 MILLIGRAM(S): at 15:19

## 2023-01-15 RX ADMIN — Medication 15 MILLIGRAM(S): at 19:09

## 2023-01-15 RX ADMIN — LEVETIRACETAM 400 MILLIGRAM(S): 250 TABLET, FILM COATED ORAL at 02:17

## 2023-01-15 NOTE — PROGRESS NOTE ADULT - ASSESSMENT
81 y/o F with PMHx seizure disorder, status epilepticus, HLD, Anxiety here with:    #breakthrough seizure  #  Acute encephalopathy - related to Depakote use vs post ictal vs metabolic encephalopathy  Depakote to 125 mg bid,switched to IV   current VPA level is 37   routine EEG, if no epileptiform activity,  -switched po to IV  Keppra 1500 mg bid and Lamotrigine 150 mg BID but trouble taking lamictal overnight and spoke with pharmacy and if unable to tolerate today they may have to procure the ODT formulation.   - continue keppra 1500 mg BID  - REDUCEd DOSE TO DEPAKOTE 125mg BID per neuro  Plan for VEEG I dw with Dr Delarosa - f/u results   CT head, CXR  clinically no obvious signs of active infection , VBG noted -no hypercarbic acidosis  - ativan PRN for seizure activity  - neuro checks; seizure precautions; elevate HOB  - neuro consult -  recent 24 hr EEG Mild diffuse/multi-focal cerebral dysfunction, not specific as to etiology.   There were no epileptiform abnormalities or seizures recorded x 1 day.    - EEG today 1/13 FOR EPIL ACTIVITY  if no improvement in mentation in next 24 hours would need corepack for meds- explained to daughter at bedside  IVF for today  patient has an appointment to the Neuro service at [facility] for tuesday    daughter Leatha is interested in a tf to [facility] - advised her that she will need to find  accepting physician at facility who will initiate transfer    #HLD  - continue home meds    #cognitive impairment  - on galantamine at home    #DVT prophylaxis  - SCDs

## 2023-01-15 NOTE — PROGRESS NOTE ADULT - SUBJECTIVE AND OBJECTIVE BOX
HPI:  81 y/o F with PMHx seizure disorder, status epilepticus, HLD, Anxiety BIBEMS for breakthrough seizure. Pt was discharged from  on 1/8 for breakthrough seizures as well, her medications were adjusted at the time.      MEDICATIONS  (STANDING):  calcium carbonate 1250 mG  + Vitamin D (OsCal 500 + D) 1 Tablet(s) Oral daily  dextrose 5% + sodium chloride 0.9% with potassium chloride 20 mEq/L 1000 milliLiter(s) (70 mL/Hr) IV Continuous <Continuous>  escitalopram 10 milliGRAM(s) Oral at bedtime  lamoTRIgine 150 milliGRAM(s) Oral two times a day  levETIRAcetam  IVPB 1000 milliGRAM(s) IV Intermittent every 12 hours  multivitamin 1 Tablet(s) Oral daily  sodium chloride 0.45%. 1000 milliLiter(s) (60 mL/Hr) IV Continuous <Continuous>  valproate sodium  IVPB 125 milliGRAM(s) IV Intermittent two times a day    MEDICATIONS  (PRN):  acetaminophen     Tablet .. 650 milliGRAM(s) Oral every 6 hours PRN Temp greater or equal to 38C (100.4F), Mild Pain (1 - 3)  aluminum hydroxide/magnesium hydroxide/simethicone Suspension 30 milliLiter(s) Oral every 4 hours PRN Dyspepsia  ketorolac   Injectable 15 milliGRAM(s) IV Push every 12 hours PRN Moderate Pain (4 - 6)  melatonin 3 milliGRAM(s) Oral at bedtime PRN Insomnia  ondansetron Injectable 4 milliGRAM(s) IV Push every 8 hours PRN Nausea and/or Vomiting      Vital Signs Last 24 Hrs  T(C): 36.8 (15 Luis Eduardo 2023 15:23), Max: 36.8 (14 Jan 2023 17:03)  T(F): 98.2 (15 Luis Eduardo 2023 15:23), Max: 98.2 (14 Jan 2023 17:03)  HR: 81 (15 Luis Eduardo 2023 15:23) (80 - 93)  BP: 145/64 (15 Luis Eduardo 2023 15:23) (145/64 - 157/75)  BP(mean): --  RR: 17 (15 Luis Eduardo 2023 15:23) (16 - 17)  SpO2: 95% (15 Luis Eduardo 2023 15:23) (95% - 98%)    Parameters below as of 15 Luis Eduardo 2023 15:23  Patient On (Oxygen Delivery Method): room air      Neurological Exam:    HF: Patient is asleep, arouses, doesnt follow commands.   CN: Pupils are equal and reactive. Extra ocular muscles are intact. There is no facial droop or asymmetry. Tongue is midline. Sensation is intact in the face. Other CN II-XII are intact.   Motor: Limited, moves all extremities.  Sensory: withdraws to PP bilat.   DTR: 0-1/4 all 4 extremities. Babinski is negative bilateral.  Co-ord:  Unable to test   Gait/balance: Unable to test.       Basic Metabolic Panel in AM (01.15.23 @ 07:24)   Sodium, Serum: 133 mmol/L   Potassium, Serum: 3.7 mmol/L   Chloride, Serum: 101 mmol/L   Carbon Dioxide, Serum: 25 mmol/L   Anion Gap, Serum: 7 mmol/L   Blood Urea Nitrogen, Serum: 6 mg/dL   Creatinine, Serum: 0.41 mg/dL   Glucose, Serum: 124 mg/dL   Calcium, Total Serum: 8.8 mg/dL   eGFR: 99:   Valproic Acid Level, Serum: 37 ug/mL (01.13.23 @ 01:27)     Levetiracetam Level, Serum: 47.9:    < from: CT Head No Cont (01.05.23 @ 21:18) >  COMPARISON: Brain MRI 5/31/2022 and head CT 5/28/2022    FINDINGS:    There is no acute intracranial hemorrhage, vasogenic edema or evidence   for acute large vascular territory infarct. Stable patchy and confluent   areas of low-attenuation in bihemispheric white matter, nonspecific, but   likely the sequela of chronic microvascular change. Chronic lacunar   infarct in the left corona radiata, unchanged.    The ventricles, sulci and cisternal spaces are stable in size and   configuration demonstrating age-related involutional change. Stable   prominence of the bifrontal extra-axial spaces of CSF density. There is   no midline shift or abnormal extra-axial fluid collection.    The calvarium is intact.  There are no osteoblastic or lytic calvarial or   skull base lesions.  The paranasal sinuses and mastoid air cells are   clear.    IMPRESSION:  Stable exam. No acute intracranial hemorrhage, vasogenic edema,   extra-axial collection or hydrocephalus. Chronic microvascular changes as   above.    < end of copied text >  INTERPRETATION:   No focal, lateralized or epileptiform features were present.     No seizure sequences occurred.     A longer study including a period of sleep may be considered, if clinically indicated, which may increase the yield of epileptiform features..     This is an abnormal EEG consistent with moderate generalized diffuse cerebral dysfunction.     This recording is consistent with metabolic derangement

## 2023-01-15 NOTE — PROGRESS NOTE ADULT - SUBJECTIVE AND OBJECTIVE BOX
CC: 83 y/o F with PMHx seizure disorder, status epilepticus, HLD, Anxiety BIBEMS for breakthrough seizure. Pt was discharged from  on 1/8 for breakthrough seizures as well, her medications were adjusted at the time. Per ED noted, pt has more more fatigued since starting depakote. They spoke to her outpatient neuro and was advised to cut dose in half but they were not able to cut the pill so they skipped her evening dose. Family noticed patient not talking and then had an episode of urinary incontinence which was similar to her previous absence seizure. She was given ativan at home.   Currently patient is confused and trying to climb out of bed to use the bathroom. She is moving all extremities. she is redirectable. She offers no complaints.     1/13 - seen this morning and she was easily arousable, knows her name, thinks it is monday, knows it is daytime - follows simple commands such as raising her hands etc but in the middle of the exam became sleepy again and exam is therefore incomplete. Later in the day - legs were tremulous per report and she was given ativan by staff - pt still lethargic, hasnt eaten anything, started on IVFs   1/14 pt remains lethargic, opens eyes  and says" yes " intermittently to all questions   1/15 - pt more alert today and states her name.  spoke with daughter and reviewed plan in detail.  she said she saw her mom yesterday and she recognized her.  daughter asking about transfer for 5 day EEG at [facility] on tuesday    ROS:   All 10 systems reviewed and found to be negative with the exception of what has been described above.    Vital Signs Last 24 Hrs  T(C): 36.5 (15 Luis Eduardo 2023 08:18), Max: 36.8 (14 Jan 2023 17:03)  T(F): 97.7 (15 Luis Eduardo 2023 08:18), Max: 98.2 (14 Jan 2023 17:03)  HR: 93 (15 Luis Eduardo 2023 08:18) (80 - 93)  BP: 150/84 (15 Luis Eduardo 2023 08:18) (150/84 - 157/75)  BP(mean): --  RR: 17 (15 Luis Eduardo 2023 08:18) (16 - 17)  SpO2: 97% (15 Luis Eduardo 2023 08:18) (97% - 98%)    Parameters below as of 15 Luis Eduardo 2023 08:18  Patient On (Oxygen Delivery Method): room air      Constitutional: lethargic   HEENT: PERR, EOMI  Neck: Soft and supple,  No JVD  Respiratory: Breath sounds are clear bilaterally, No wheezing, rales or rhonchi  Cardiovascular: S1 and S2, regular rate and rhythm, no Murmurs  Gastrointestinal: Bowel Sounds present, soft, nontender, nondistended  Extremities: No peripheral edema  Vascular: 2+ peripheral pulses  Neurological: lethargic , does not follow commands   Skin: No rashes                              12.3   6.65  )-----------( 175      ( 14 Jan 2023 16:36 )             35.8     01-15    133<L>  |  101  |  6<L>  ----------------------------<  124<H>  3.7   |  25  |  0.41<L>    Ca    8.8      15 Luis Eduardo 2023 07:24          RADIOLOGY/EKG:  < from: CT Head No Cont (01.05.23 @ 21:18) >  Stable exam. No acute intracranial hemorrhage, vasogenic edema,   extra-axial collection or hydrocephalus. Chronic microvascular changes as   above.      EEG 1/13/2023  No focal, lateralized or epileptiform features were present.   No seizure sequences occurred.   A longer study including a period of sleep may be considered, if clinically indicated, which may increase the yield of epileptiform features..   This is an abnormal EEG consistent with moderate generalized diffuse cerebral dysfunction.   This recording is consistent with metabolic derangement.

## 2023-01-15 NOTE — PROGRESS NOTE ADULT - ASSESSMENT
81 y/o woman with PMHx HLD, Anxiety, seizure disorder/status epilepticus, recently admitted to  1/5/22 for recurrent seizure. Pt was d/c home on 1/8/22 on Depakote 250 mg q12, Keppra 1500 mg bid and Lamotrigine 150 mg BID. returns to , after ? seizure by family. they had been trying to reduce depakote thought causing sedation, confusion, and had skipped doses, prior to arrival here. Now not taking oral meds, so lamotrigine not given last 2 days.  Suggest:  depakote 500 mg iv bolus, then 250 mg BID  continue keppra 1500 mg BID  consider placing NGT and give lamotrigine 150 mg BID    Discussed with Dr. Denson

## 2023-01-16 ENCOUNTER — TRANSCRIPTION ENCOUNTER (OUTPATIENT)
Age: 83
End: 2023-01-16

## 2023-01-16 LAB
ANION GAP SERPL CALC-SCNC: 6 MMOL/L — SIGNIFICANT CHANGE UP (ref 5–17)
BUN SERPL-MCNC: 6 MG/DL — LOW (ref 7–23)
CALCIUM SERPL-MCNC: 8.6 MG/DL — SIGNIFICANT CHANGE UP (ref 8.5–10.1)
CHLORIDE SERPL-SCNC: 103 MMOL/L — SIGNIFICANT CHANGE UP (ref 96–108)
CO2 SERPL-SCNC: 25 MMOL/L — SIGNIFICANT CHANGE UP (ref 22–31)
CREAT SERPL-MCNC: 0.51 MG/DL — SIGNIFICANT CHANGE UP (ref 0.5–1.3)
EGFR: 94 ML/MIN/1.73M2 — SIGNIFICANT CHANGE UP
GLUCOSE SERPL-MCNC: 106 MG/DL — HIGH (ref 70–99)
POTASSIUM SERPL-MCNC: 3.7 MMOL/L — SIGNIFICANT CHANGE UP (ref 3.5–5.3)
POTASSIUM SERPL-SCNC: 3.7 MMOL/L — SIGNIFICANT CHANGE UP (ref 3.5–5.3)
SODIUM SERPL-SCNC: 134 MMOL/L — LOW (ref 135–145)

## 2023-01-16 PROCEDURE — 99232 SBSQ HOSP IP/OBS MODERATE 35: CPT

## 2023-01-16 PROCEDURE — 99233 SBSQ HOSP IP/OBS HIGH 50: CPT

## 2023-01-16 RX ADMIN — ESCITALOPRAM OXALATE 10 MILLIGRAM(S): 10 TABLET, FILM COATED ORAL at 21:36

## 2023-01-16 RX ADMIN — LAMOTRIGINE 150 MILLIGRAM(S): 25 TABLET, ORALLY DISINTEGRATING ORAL at 21:36

## 2023-01-16 RX ADMIN — LAMOTRIGINE 150 MILLIGRAM(S): 25 TABLET, ORALLY DISINTEGRATING ORAL at 09:45

## 2023-01-16 RX ADMIN — LEVETIRACETAM 400 MILLIGRAM(S): 250 TABLET, FILM COATED ORAL at 02:11

## 2023-01-16 RX ADMIN — Medication 650 MILLIGRAM(S): at 21:35

## 2023-01-16 RX ADMIN — Medication 52.5 MILLIGRAM(S): at 14:31

## 2023-01-16 RX ADMIN — Medication 52.5 MILLIGRAM(S): at 02:54

## 2023-01-16 RX ADMIN — Medication 52.5 MILLIGRAM(S): at 23:23

## 2023-01-16 RX ADMIN — Medication 1 TABLET(S): at 09:45

## 2023-01-16 RX ADMIN — Medication 1 TABLET(S): at 09:46

## 2023-01-16 RX ADMIN — LEVETIRACETAM 400 MILLIGRAM(S): 250 TABLET, FILM COATED ORAL at 14:25

## 2023-01-16 NOTE — PROGRESS NOTE ADULT - ASSESSMENT
81 y/o F with PMHx seizure disorder, status epilepticus, HLD, Anxiety here with:    #breakthrough seizure  #  Acute encephalopathy - related to Depakote use vs post ictal vs metabolic encephalopathy  - case d/w dr patiño  and will continue depakote  250 mg BID  continue keppra 1000 mg BID  continue  lamotrigine 150 mg BID  clinically no obvious signs of active infection , VBG noted -no hypercarbic acidosis  - ativan PRN for seizure activity  - neuro checks; seizure precautions; elevate HOB  - neuro consult -  recent 24 hr EEG Mild diffuse/multi-focal cerebral dysfunction, not specific as to etiology.   There were no epileptiform abnormalities or seizures recorded x 1 day.    - EEG results noted  patient has an appointment to the Neuro service at [facility] for tuesday   daughter Leatha is interested in a tf to [facility] - advised her that she will need to find  accepting physician at facility who will initiate transfer    #HLD  - continue home meds    #cognitive impairment  - on galantamine at home    # right wrist pain - swelling decreased with minor erythema  - cool packs prn   - pain meds prn  - f/u XR results     #DVT prophylaxis  - SCDs      1/16 - family updated at bedside and results reviewed

## 2023-01-16 NOTE — PROGRESS NOTE ADULT - SUBJECTIVE AND OBJECTIVE BOX
CC: 83 y/o F with PMHx seizure disorder, status epilepticus, HLD, Anxiety BIBEMS for breakthrough seizure. Pt was discharged from  on 1/8 for breakthrough seizures as well, her medications were adjusted at the time. Per ED noted, pt has more more fatigued since starting depakote. They spoke to her outpatient neuro and was advised to cut dose in half but they were not able to cut the pill so they skipped her evening dose. Family noticed patient not talking and then had an episode of urinary incontinence which was similar to her previous absence seizure. She was given ativan at home.   Currently patient is confused and trying to climb out of bed to use the bathroom. She is moving all extremities. she is redirectable. She offers no complaints.     1/13 - seen this morning and she was easily arousable, knows her name, thinks it is monday, knows it is daytime - follows simple commands such as raising her hands etc but in the middle of the exam became sleepy again and exam is therefore incomplete. Later in the day - legs were tremulous per report and she was given ativan by staff - pt still lethargic, hasnt eaten anything, started on IVFs   1/14 pt remains lethargic, opens eyes  and says" yes " intermittently to all questions   1/15 - pt more alert today and states her name.  spoke with daughter and reviewed plan in detail.  she said she saw her mom yesterday and she recognized her.  daughter asking about transfer for 5 day EEG at [facility] on tuesday 1/16 - pt seen and examined and family updated at bedside.  as per RN pt was more alert and ate breakfast and took po meds but continues to have periods of confusion    ROS:   All 10 systems reviewed and found to be negative with the exception of what has been described above.    Vital Signs Last 24 Hrs  T(C): 36.3 (16 Jan 2023 07:57), Max: 36.8 (15 Luis Eduardo 2023 15:23)  T(F): 97.3 (16 Jan 2023 07:57), Max: 98.2 (15 Luis Eduardo 2023 15:23)  HR: 86 (16 Jan 2023 07:57) (81 - 94)  BP: 129/95 (16 Jan 2023 07:57) (129/95 - 150/76)  BP(mean): --  RR: 18 (16 Jan 2023 07:57) (17 - 18)  SpO2: 94% (16 Jan 2023 07:57) (94% - 97%)    Parameters below as of 16 Jan 2023 07:57  Patient On (Oxygen Delivery Method): room air      Constitutional: somnolent   HEENT: nC/AT   Neck: Soft and supple,  No JVD  Respiratory: Breath sounds are clear bilaterally, No wheezing, rales or rhonchi  Cardiovascular: S1 and S2, regular rate and rhythm, no Murmurs  Gastrointestinal: Bowel Sounds present, soft, nontender, nondistended  Extremities: No peripheral edema  Vascular: 2+ peripheral pulses  Neurological: more alert today and follows some commands.  AAO X 1 to person   Skin: No rashes                          12.3   6.65  )-----------( 175      ( 14 Jan 2023 16:36 )             35.8     01-16    134<L>  |  103  |  6<L>  ----------------------------<  106<H>  3.7   |  25  |  0.51    Ca    8.6      16 Jan 2023 06:16      < from: EEG w/ Video Each 12-26 Hours, Unmonitored (01.15.23 @ 12:30) >    IMPRESSION: Abnormal EEG, because of slowing of the background activity,   excessive amount of theta and delta range activities, consistent with a   diffuse cerebral dysfunction, maybe on the basis of a diffuse metabolic,   toxic or structural normality.  Left hemisphere, theta range activity, consistent with an underlying   structural abnormality.  Intermittent, transient epileptiform discharges which are seen   bilaterally and generalized, but more commonly over the left hemisphere.  Clinical correlation recommended.    < end of copied text >        RADIOLOGY/EKG:  < from: CT Head No Cont (01.05.23 @ 21:18) >  Stable exam. No acute intracranial hemorrhage, vasogenic edema,   extra-axial collection or hydrocephalus. Chronic microvascular changes as   above.      EEG 1/13/2023  No focal, lateralized or epileptiform features were present.   No seizure sequences occurred.   A longer study including a period of sleep may be considered, if clinically indicated, which may increase the yield of epileptiform features..   This is an abnormal EEG consistent with moderate generalized diffuse cerebral dysfunction.   This recording is consistent with metabolic derangement.

## 2023-01-16 NOTE — PROGRESS NOTE ADULT - ASSESSMENT
81 y/o woman with PMHx HLD, Anxiety, seizure disorder/status epilepticus, recently admitted to  1/5/22 for recurrent seizure. Pt was d/c home on 1/8/22 on Depakote 250 mg q12, Keppra 1500 mg bid and Lamotrigine 150 mg BID. returns to , after ? seizure by family. they had been trying to reduce depakote thought causing sedation, confusion, and had skipped doses, prior to arrival here. .  Suggest:  depakote 500 mg iv bolus, then 250 mg BID  continue keppra 1500 mg BID  continue  lamotrigine 150 mg BID  PT eval 83 y/o woman with PMHx HLD, Anxiety, seizure disorder/status epilepticus, recently admitted to  1/5/22 for recurrent seizure. Pt was d/c home on 1/8/22 on Depakote 250 mg q12, Keppra 1500 mg bid and Lamotrigine 150 mg BID. returns to , after ? seizure by family. they had been trying to reduce depakote thought causing sedation, confusion, and had skipped doses, prior to arrival here. .  Suggest:  depakote 500 mg iv bolus, then 250 mg BID  continue keppra 1000 mg BID  continue  lamotrigine 150 mg BID  PT eval

## 2023-01-16 NOTE — PROGRESS NOTE ADULT - SUBJECTIVE AND OBJECTIVE BOX
HPI:  81 y/o F with PMHx seizure disorder, status epilepticus, HLD, Anxiety BIBEMS for breakthrough seizure. Currently patient is confused and offers no complaints.        MEDICATIONS  (STANDING):  calcium carbonate 1250 mG  + Vitamin D (OsCal 500 + D) 1 Tablet(s) Oral daily  dextrose 5% + sodium chloride 0.9% with potassium chloride 20 mEq/L 1000 milliLiter(s) (70 mL/Hr) IV Continuous <Continuous>  escitalopram 10 milliGRAM(s) Oral at bedtime  lamoTRIgine 150 milliGRAM(s) Oral two times a day  levETIRAcetam  IVPB 1000 milliGRAM(s) IV Intermittent every 12 hours  multivitamin 1 Tablet(s) Oral daily  sodium chloride 0.45%. 1000 milliLiter(s) (60 mL/Hr) IV Continuous <Continuous>  valproate sodium  IVPB 250 milliGRAM(s) IV Intermittent every 12 hours    MEDICATIONS  (PRN):  acetaminophen     Tablet .. 650 milliGRAM(s) Oral every 6 hours PRN Temp greater or equal to 38C (100.4F), Mild Pain (1 - 3)  aluminum hydroxide/magnesium hydroxide/simethicone Suspension 30 milliLiter(s) Oral every 4 hours PRN Dyspepsia  ketorolac   Injectable 15 milliGRAM(s) IV Push every 12 hours PRN Moderate Pain (4 - 6)  melatonin 3 milliGRAM(s) Oral at bedtime PRN Insomnia  ondansetron Injectable 4 milliGRAM(s) IV Push every 8 hours PRN Nausea and/or Vomiting      Vital Signs Last 24 Hrs  T(C): 36.3 (16 Jan 2023 07:57), Max: 36.8 (15 Luis Eduardo 2023 15:23)  T(F): 97.3 (16 Jan 2023 07:57), Max: 98.2 (15 Luis Eduardo 2023 15:23)  HR: 86 (16 Jan 2023 07:57) (81 - 94)  BP: 129/95 (16 Jan 2023 07:57) (129/95 - 150/76)  RR: 18 (16 Jan 2023 07:57) (17 - 18)  SpO2: 94% (16 Jan 2023 07:57) (94% - 97%)    Parameters below as of 16 Jan 2023 07:57  Patient On (Oxygen Delivery Method): room air      Neurological Exam:    HF: Patient is alert and oriented x 2. Follows some commands.   CN: Pupils are equal and reactive. Extra ocular muscles are intact. There is no facial droop or asymmetry. Tongue is midline. Sensation is intact in the face. Other CN II-XII are intact.   Motor: motor examination all muscles are ~3/5, poor effort.   Sensory: intact to pinprick .   DTR: 1-2/4 all 4 extremities. Babinski is negative bilateral.  Co-ord:  Limited.   Gait/balance: unable to test.   Romberg is negative.     Basic Metabolic Panel in AM (01.16.23 @ 06:16)   Sodium, Serum: 134 mmol/L   Potassium, Serum: 3.7 mmol/L   Chloride, Serum: 103 mmol/L   Carbon Dioxide, Serum: 25 mmol/L   Anion Gap, Serum: 6 mmol/L   Blood Urea Nitrogen, Serum: 6 mg/dL   Creatinine, Serum: 0.51 mg/dL   Glucose, Serum: 106 mg/dL   Calcium, Total Serum: 8.6 mg/dL   eGFR: 94: T      < from: EEG w/ Video Each 12-26 Hours, Unmonitored (01.15.23 @ 12:30) >    IMPRESSION: Abnormal EEG, because of slowing of the background activity,   excessive amount of theta and delta range activities, consistent with a   diffuse cerebral dysfunction, maybe on the basis of a diffuse metabolic,   toxic or structural normality.  Left hemisphere, theta range activity, consistent with an underlying   structural abnormality.  Intermittent, transient epileptiform discharges which are seen   bilaterally and generalized, but more commonly over the left hemisphere.  Clinical correlation recommended.    < end of copied text >

## 2023-01-17 LAB
LEVETIRACETAM SERPL-MCNC: 41 UG/ML — HIGH (ref 10–40)
SARS-COV-2 RNA SPEC QL NAA+PROBE: SIGNIFICANT CHANGE UP

## 2023-01-17 PROCEDURE — 99232 SBSQ HOSP IP/OBS MODERATE 35: CPT

## 2023-01-17 PROCEDURE — 99233 SBSQ HOSP IP/OBS HIGH 50: CPT

## 2023-01-17 RX ORDER — DIVALPROEX SODIUM 500 MG/1
125 TABLET, DELAYED RELEASE ORAL EVERY 6 HOURS
Refills: 0 | Status: DISCONTINUED | OUTPATIENT
Start: 2023-01-17 | End: 2023-01-17

## 2023-01-17 RX ORDER — LEVETIRACETAM 250 MG/1
1000 TABLET, FILM COATED ORAL
Refills: 0 | Status: DISCONTINUED | OUTPATIENT
Start: 2023-01-17 | End: 2023-01-18

## 2023-01-17 RX ORDER — DIVALPROEX SODIUM 500 MG/1
125 TABLET, DELAYED RELEASE ORAL EVERY 6 HOURS
Refills: 0 | Status: DISCONTINUED | OUTPATIENT
Start: 2023-01-17 | End: 2023-01-18

## 2023-01-17 RX ADMIN — Medication 1 TABLET(S): at 09:53

## 2023-01-17 RX ADMIN — LEVETIRACETAM 1000 MILLIGRAM(S): 250 TABLET, FILM COATED ORAL at 21:14

## 2023-01-17 RX ADMIN — LAMOTRIGINE 150 MILLIGRAM(S): 25 TABLET, ORALLY DISINTEGRATING ORAL at 21:14

## 2023-01-17 RX ADMIN — LEVETIRACETAM 400 MILLIGRAM(S): 250 TABLET, FILM COATED ORAL at 02:12

## 2023-01-17 RX ADMIN — Medication 52.5 MILLIGRAM(S): at 09:52

## 2023-01-17 RX ADMIN — LAMOTRIGINE 150 MILLIGRAM(S): 25 TABLET, ORALLY DISINTEGRATING ORAL at 09:52

## 2023-01-17 RX ADMIN — DIVALPROEX SODIUM 125 MILLIGRAM(S): 500 TABLET, DELAYED RELEASE ORAL at 18:10

## 2023-01-17 RX ADMIN — ESCITALOPRAM OXALATE 10 MILLIGRAM(S): 10 TABLET, FILM COATED ORAL at 21:14

## 2023-01-17 NOTE — PROGRESS NOTE ADULT - SUBJECTIVE AND OBJECTIVE BOX
CC: 83 y/o F with PMHx seizure disorder, status epilepticus, HLD, Anxiety BIBEMS for breakthrough seizure. Pt was discharged from  on 1/8 for breakthrough seizures as well, her medications were adjusted at the time. Per ED noted, pt has more more fatigued since starting depakote. They spoke to her outpatient neuro and was advised to cut dose in half but they were not able to cut the pill so they skipped her evening dose. Family noticed patient not talking and then had an episode of urinary incontinence which was similar to her previous absence seizure. She was given ativan at home.   Currently patient is confused and trying to climb out of bed to use the bathroom. She is moving all extremities. she is redirectable. She offers no complaints.     1/13 - seen this morning and she was easily arousable, knows her name, thinks it is monday, knows it is daytime - follows simple commands such as raising her hands etc but in the middle of the exam became sleepy again and exam is therefore incomplete. Later in the day - legs were tremulous per report and she was given ativan by staff - pt still lethargic, hasnt eaten anything, started on IVFs   1/14 pt remains lethargic, opens eyes  and says" yes " intermittently to all questions   1/15 - pt more alert today and states her name.  spoke with daughter and reviewed plan in detail.  she said she saw her mom yesterday and she recognized her.  daughter asking about transfer for 5 day EEG at [facility] on tuesday 1/16 - pt seen and examined and family updated at bedside.  as per RN pt was more alert and ate breakfast and took po meds but continues to have periods of confusion  1/17 - pt doing better today and more alert and sitting OOB to chair.   and daughter at bedside.  spoke with dr nicolas [phone] at [facility] and pt is accepted for transfer.      ROS:   All 10 systems reviewed and found to be negative with the exception of what has been described above.    Vital Signs Last 24 Hrs  T(C): 36.8 (17 Jan 2023 08:12), Max: 37.7 (16 Jan 2023 19:14)  T(F): 98.2 (17 Jan 2023 08:12), Max: 99.9 (16 Jan 2023 19:14)  HR: 74 (17 Jan 2023 08:12) (74 - 89)  BP: 139/58 (17 Jan 2023 08:12) (119/63 - 139/58)  BP(mean): 80 (16 Jan 2023 16:15) (80 - 80)  RR: 19 (17 Jan 2023 08:12) (18 - 19)  SpO2: 94% (17 Jan 2023 08:12) (94% - 96%)    Parameters below as of 17 Jan 2023 08:12  Patient On (Oxygen Delivery Method): room air      Constitutional: somnolent   HEENT: nC/AT   Neck: Soft and supple,  No JVD  Respiratory: Breath sounds are clear bilaterally, No wheezing, rales or rhonchi  Cardiovascular: S1 and S2, regular rate and rhythm, no Murmurs  Gastrointestinal: Bowel Sounds present, soft, nontender, nondistended  Extremities: No peripheral edema  Vascular: 2+ peripheral pulses  Neurological: more alert today and follows some commands.  AAO X 1 to person   Skin: No rashes              01-16    134<L>  |  103  |  6<L>  ----------------------------<  106<H>  3.7   |  25  |  0.51    Ca    8.6      16 Jan 2023 06:16        < from: EEG w/ Video Each 12-26 Hours, Unmonitored (01.15.23 @ 12:30) >    IMPRESSION: Abnormal EEG, because of slowing of the background activity,   excessive amount of theta and delta range activities, consistent with a   diffuse cerebral dysfunction, maybe on the basis of a diffuse metabolic,   toxic or structural normality.  Left hemisphere, theta range activity, consistent with an underlying   structural abnormality.  Intermittent, transient epileptiform discharges which are seen   bilaterally and generalized, but more commonly over the left hemisphere.  Clinical correlation recommended.    < end of copied text >        RADIOLOGY/EKG:  < from: CT Head No Cont (01.05.23 @ 21:18) >  Stable exam. No acute intracranial hemorrhage, vasogenic edema,   extra-axial collection or hydrocephalus. Chronic microvascular changes as   above.      EEG 1/13/2023  No focal, lateralized or epileptiform features were present.   No seizure sequences occurred.   A longer study including a period of sleep may be considered, if clinically indicated, which may increase the yield of epileptiform features..   This is an abnormal EEG consistent with moderate generalized diffuse cerebral dysfunction.   This recording is consistent with metabolic derangement.

## 2023-01-17 NOTE — PROGRESS NOTE ADULT - ASSESSMENT
83 y/o F with PMHx seizure disorder, status epilepticus, HLD, Anxiety here with:    #breakthrough seizure  #  Acute encephalopathy - related to Depakote use vs post ictal vs metabolic encephalopathy  - case d/w dr patiño  and will continue depakote  250 mg BID  continue keppra 1000 mg BID  continue  lamotrigine 150 mg BID  - switch to orals   clinically no obvious signs of active infection , VBG noted -no hypercarbic acidosis  - ativan PRN for seizure activity  - neuro checks; seizure precautions; elevate HOB  - neuro consult -  recent 24 hr EEG Mild diffuse/multi-focal cerebral dysfunction, not specific as to etiology.   There were no epileptiform abnormalities or seizures recorded x 1 day.    - EEG results noted  patient has an appointment to the Neuro service at [facility] for tuesday but no bed avail for transfer today and they will eval her case again in AM     #HLD  - continue home meds    #cognitive impairment  - on galantamine at home    # right wrist pain - swelling decreased with minor erythema  - cool packs prn   - pain meds prn  - f/u XR results     #DVT prophylaxis  - SCDs      1/16 - family updated at bedside and results reviewed   1/17 - hcp updated and agreeable to plan for transfer

## 2023-01-17 NOTE — PROGRESS NOTE ADULT - ASSESSMENT
83 y/o woman with PMHx HLD, Anxiety, seizure disorder/status epilepticus, recently admitted to  1/5/22 for recurrent seizure. Pt was d/c home on 1/8/22 on Depakote 250 mg q12, Keppra 1500 mg bid and Lamotrigine 150 mg BID. returned to , with change in MS, likely seizure with prolonged post ictal state. family would like to transfer PT to [facility] for EEG monitoring,  spoke with DR. Silvio Jones, neurologist at [facility], phone # [phone].   Suggest:  switch to PO:  depakote 250 mg BID  continue keppra 1000 mg BID  continue  lamotrigine 150 mg BID  PT eval  If does well today can d/c home tomorrow, unless family have PT transfered to [facility].  Discussed with Dr. Denson

## 2023-01-17 NOTE — PROGRESS NOTE ADULT - SUBJECTIVE AND OBJECTIVE BOX
HPI:  83 y/o F with PMHx seizure disorder, status epilepticus, HLD, Anxiety BIBEMS for breakthrough seizure. Pt was discharged from  on 1/8 for breakthrough seizures as well, her medications were adjusted at the time. Per ED noted, pt has more more fatigued since starting depakote. They spoke to her outpatient neuro and was advised to cut dose in half but they were not able to cut the pill so they skipped her evening dose.   Today more awake, sitting.      MEDICATIONS  (STANDING):  calcium carbonate 1250 mG  + Vitamin D (OsCal 500 + D) 1 Tablet(s) Oral daily  dextrose 5% + sodium chloride 0.9% with potassium chloride 20 mEq/L 1000 milliLiter(s) (70 mL/Hr) IV Continuous <Continuous>  divalproex Sprinkle 125 milliGRAM(s) Oral every 6 hours  escitalopram 10 milliGRAM(s) Oral at bedtime  lamoTRIgine 150 milliGRAM(s) Oral two times a day  levETIRAcetam  Solution 1000 milliGRAM(s) Oral two times a day  multivitamin 1 Tablet(s) Oral daily  sodium chloride 0.45%. 1000 milliLiter(s) (60 mL/Hr) IV Continuous <Continuous>    MEDICATIONS  (PRN):  acetaminophen     Tablet .. 650 milliGRAM(s) Oral every 6 hours PRN Temp greater or equal to 38C (100.4F), Mild Pain (1 - 3)  aluminum hydroxide/magnesium hydroxide/simethicone Suspension 30 milliLiter(s) Oral every 4 hours PRN Dyspepsia  ketorolac   Injectable 15 milliGRAM(s) IV Push every 12 hours PRN Moderate Pain (4 - 6)  melatonin 3 milliGRAM(s) Oral at bedtime PRN Insomnia  ondansetron Injectable 4 milliGRAM(s) IV Push every 8 hours PRN Nausea and/or Vomiting      Vital Signs Last 24 Hrs  T(C): 36.8 (17 Jan 2023 08:12), Max: 37.7 (16 Jan 2023 19:14)  T(F): 98.2 (17 Jan 2023 08:12), Max: 99.9 (16 Jan 2023 19:14)  HR: 74 (17 Jan 2023 08:12) (74 - 89)  BP: 139/58 (17 Jan 2023 08:12) (119/63 - 139/58)  BP(mean): 80 (16 Jan 2023 16:15) (80 - 80)  RR: 19 (17 Jan 2023 08:12) (18 - 19)  SpO2: 94% (17 Jan 2023 08:12) (94% - 96%)    Parameters below as of 17 Jan 2023 08:12  Patient On (Oxygen Delivery Method): room air      Neurological Exam:  HF: Patient is alert and oriented x 2. Inattentive, fluent, follows commands.   CN:  Pupils are equal and reactive. Extra ocular muscles are intact. There is no facial droop or asymmetry. Tongue is midline. Sensation is intact in the face. Other CN II-XII are intact.   Motor: motor examination all muscles are 4/5.   Sensory: intact to touch.   DTR: 0-1/4 all 4 extremities. Babinski is negative bilateral.  Co-ord:  Finger to finger to nose is intact.    Sodium, Serum: 134 mmol/L   Potassium, Serum: 3.7 mmol/L   Chloride, Serum: 103 mmol/L   Carbon Dioxide, Serum: 25 mmol/L   Anion Gap, Serum: 6 mmol/L   Blood Urea Nitrogen, Serum: 6 mg/dL   Creatinine, Serum: 0.51 mg/dL   Glucose, Serum: 106 mg/dL   Calcium, Total Serum: 8.6 mg/dL   eGFR: 94:    < from: EEG w/ Video Each 12-26 Hours, Unmonitored (01.15.23 @ 12:30) >    IMPRESSION: Abnormal EEG, because of slowing of the background activity,   excessive amount of theta and delta range activities, consistent with a   diffuse cerebral dysfunction, maybe on the basis of a diffuse metabolic,   toxic or structural normality.  Left hemisphere, theta range activity, consistent with an underlying   structural abnormality.  Intermittent, transient epileptiform discharges which are seen   bilaterally and generalized, but more commonly over the left hemisphere.  Clinical correlation recommended.    < end of copied text >

## 2023-01-18 ENCOUNTER — TRANSCRIPTION ENCOUNTER (OUTPATIENT)
Age: 83
End: 2023-01-18

## 2023-01-18 VITALS
TEMPERATURE: 98 F | OXYGEN SATURATION: 94 % | DIASTOLIC BLOOD PRESSURE: 52 MMHG | SYSTOLIC BLOOD PRESSURE: 141 MMHG | RESPIRATION RATE: 19 BRPM | HEART RATE: 85 BPM

## 2023-01-18 PROCEDURE — 99239 HOSP IP/OBS DSCHRG MGMT >30: CPT

## 2023-01-18 RX ORDER — LEVETIRACETAM 250 MG/1
2 TABLET, FILM COATED ORAL
Qty: 0 | Refills: 0 | DISCHARGE

## 2023-01-18 RX ORDER — LEVETIRACETAM 250 MG/1
10 TABLET, FILM COATED ORAL
Qty: 0 | Refills: 0 | DISCHARGE
Start: 2023-01-18

## 2023-01-18 RX ORDER — ESCITALOPRAM OXALATE 10 MG/1
1 TABLET, FILM COATED ORAL
Qty: 0 | Refills: 0 | DISCHARGE

## 2023-01-18 RX ORDER — DIVALPROEX SODIUM 500 MG/1
1 TABLET, DELAYED RELEASE ORAL
Qty: 0 | Refills: 0 | DISCHARGE
Start: 2023-01-18

## 2023-01-18 RX ORDER — LAMOTRIGINE 25 MG/1
2 TABLET, ORALLY DISINTEGRATING ORAL
Qty: 0 | Refills: 0 | DISCHARGE

## 2023-01-18 RX ADMIN — DIVALPROEX SODIUM 125 MILLIGRAM(S): 500 TABLET, DELAYED RELEASE ORAL at 01:38

## 2023-01-18 RX ADMIN — DIVALPROEX SODIUM 125 MILLIGRAM(S): 500 TABLET, DELAYED RELEASE ORAL at 06:10

## 2023-01-18 RX ADMIN — LEVETIRACETAM 1000 MILLIGRAM(S): 250 TABLET, FILM COATED ORAL at 11:29

## 2023-01-18 RX ADMIN — Medication 1 TABLET(S): at 09:45

## 2023-01-18 RX ADMIN — Medication 1 TABLET(S): at 09:46

## 2023-01-18 RX ADMIN — LAMOTRIGINE 150 MILLIGRAM(S): 25 TABLET, ORALLY DISINTEGRATING ORAL at 09:47

## 2023-01-18 RX ADMIN — DIVALPROEX SODIUM 125 MILLIGRAM(S): 500 TABLET, DELAYED RELEASE ORAL at 11:55

## 2023-01-18 NOTE — DISCHARGE NOTE PROVIDER - NSDCMRMEDTOKEN_GEN_ALL_CORE_FT
Aleve 220 mg oral tablet: 1 tab(s) orally every 12 hours, As Needed  Calcium 600+D oral tablet: 1 tab(s) orally once a day  divalproex sodium 125 mg oral delayed release capsule: 1 cap(s) orally every 6 hours  ezetimibe 10 mg oral tablet: 1 tab(s) orally once a day  galantamine 4 mg oral tablet: 1 tab(s) orally once a day (in the evening)  lamoTRIgine 100 mg oral tablet, extended release: 1 tab(s) orally 2 times a day    ***Patient takes two 25 mg tablets with one 100 mg tablet twice a day. Total dose 150 mg in the morning and 150 mg at night.***  levETIRAcetam 100 mg/mL oral solution: 10 milliliter(s) orally 2 times a day  Multiple Vitamins oral tablet: 1 tab(s) orally once a day  Omega-3 1000 mg oral capsule: 1 cap(s) orally once a day

## 2023-01-18 NOTE — DISCHARGE NOTE NURSING/CASE MANAGEMENT/SOCIAL WORK - PATIENT PORTAL LINK FT
You can access the FollowMyHealth Patient Portal offered by [facility] by registering at the following website: [email or web address]. By joining [facility]’s FollowMyHealth portal, you will also be able to view your health information using other applications (apps) compatible with our system.

## 2023-01-18 NOTE — DISCHARGE NOTE PROVIDER - HOSPITAL COURSE
· Subjective and Objective:   CC: 83 y/o F with PMHx seizure disorder, status epilepticus, HLD, Anxiety BIBEMS for breakthrough seizure. Pt was discharged from  on 1/8 for breakthrough seizures as well, her medications were adjusted at the time. Per ED noted, pt has more more fatigued since starting depakote. They spoke to her outpatient neuro and was advised to cut dose in half but they were not able to cut the pill so they skipped her evening dose. Family noticed patient not talking and then had an episode of urinary incontinence which was similar to her previous absence seizure. She was given ativan at home.   Currently patient is confused and trying to climb out of bed to use the bathroom. She is moving all extremities. she is redirectable. She offers no complaints.     pt admitted and seen by neuro.  pt was started on IV keppra and depacon with mental status improving.   pt now more alert and transitioned to oral meds with plan for transfer to [facility] under care of dr savage for 5 day EEG monitoring.  as per dr sullivan further medication adjustment at [facility].  spoke with dr nicolas [phone] at [facility] and pt is accepted for transfer.      ROS:   All 10 systems reviewed and found to be negative with the exception of what has been described above.  Vital Signs Last 24 Hrs  T(C): 36.4 (18 Jan 2023 09:10), Max: 37.1 (17 Jan 2023 20:53)  T(F): 97.5 (18 Jan 2023 09:10), Max: 98.8 (17 Jan 2023 20:53)  HR: 85 (18 Jan 2023 09:10) (84 - 100)  BP: 141/52 (18 Jan 2023 09:10) (126/54 - 141/52)  BP(mean): --  RR: 19 (18 Jan 2023 09:10) (18 - 19)  SpO2: 94% (18 Jan 2023 09:10) (94% - 96%)    Parameters below as of 18 Jan 2023 09:10  Patient On (Oxygen Delivery Method): room air    Constitutional: somnolent   HEENT: nC/AT   Neck: Soft and supple,  No JVD  Respiratory: Breath sounds are clear bilaterally, No wheezing, rales or rhonchi  Cardiovascular: S1 and S2, regular rate and rhythm, no Murmurs  Gastrointestinal: Bowel Sounds present, soft, nontender, nondistended  Extremities: No peripheral edema  Vascular: 2+ peripheral pulses  Neurological: more alert today and follows some commands.  AAO X 1 to person   Skin: No rashes    < from: EEG w/ Video Each 12-26 Hours, Unmonitored (01.15.23 @ 12:30) >    IMPRESSION: Abnormal EEG, because of slowing of the background activity,   excessive amount of theta and delta range activities, consistent with a   diffuse cerebral dysfunction, maybe on the basis of a diffuse metabolic,   toxic or structural normality.  Left hemisphere, theta range activity, consistent with an underlying   structural abnormality.  Intermittent, transient epileptiform discharges which are seen   bilaterally and generalized, but more commonly over the left hemisphere.  Clinical correlation recommended.    RADIOLOGY/EKG:  < from: CT Head No Cont (01.05.23 @ 21:18) >  Stable exam. No acute intracranial hemorrhage, vasogenic edema,   extra-axial collection or hydrocephalus. Chronic microvascular changes as   above.    EEG 1/13/2023  No focal, lateralized or epileptiform features were present.   No seizure sequences occurred.   A longer study including a period of sleep may be considered, if clinically indicated, which may increase the yield of epileptiform features..   This is an abnormal EEG consistent with moderate generalized diffuse cerebral dysfunction.   This recording is consistent with metabolic derangement.    Assessment and Plan:   · Assessment	  83 y/o F with PMHx seizure disorder, status epilepticus, HLD, Anxiety here with:    #breakthrough seizure  #  Acute encephalopathy - was likely periods of post ictal  - further EEG monitoring at [facility]  will continue depakote  250 mg BID  continue keppra 1000 mg BID  continue  lamotrigine 150 mg BID  - switch to orals   clinically no obvious signs of active infection , VBG noted -no hypercarbic acidosis  - ativan PRN for seizure activity  - neuro checks; seizure precautions; elevate HOB  - neuro consult -  recent 24 hr EEG Mild diffuse/multi-focal cerebral dysfunction, not specific as to etiology.   There were no epileptiform abnormalities or seizures recorded x 1 day.    - EEG results noted  patient has an appointment to the Neuro service at [facility] for tuesday but no bed avail for transfer today and they will eval her case again in AM     #HLD  - continue home meds    #cognitive impairment  - on galantamine at home    # right wrist pain - resolved. no s/s infection  xray negative      time spent 35 mins     1/16 - family updated at bedside and results reviewed   1/17 - hcp updated and agreeable to plan for transfer

## 2023-01-18 NOTE — DISCHARGE NOTE PROVIDER - CARE PROVIDER_API CALL
Mattie Horton)  Internal Medicine  [address] [place] [address]  Phone: [phone]  Fax: [phone]  Follow Up Time:     Silvio Jones)  Clinical Neurophysiology; EEGEpilepsy; Neurology  [address] [place] [address]  Phone: [phone]  Fax: [phone]  Follow Up Time:

## 2023-01-18 NOTE — DISCHARGE NOTE NURSING/CASE MANAGEMENT/SOCIAL WORK - NSDCPEFALRISK_GEN_ALL_CORE
For information on Fall & Injury Prevention, visit: [email or web address] OR  [email or web address] OR  https://www.cdc.gov/steadi/patient.html

## 2023-01-18 NOTE — DISCHARGE NOTE PROVIDER - NSDCCPCAREPLAN_GEN_ALL_CORE_FT
PRINCIPAL DISCHARGE DIAGNOSIS  Diagnosis: Seizure  Assessment and Plan of Treatment: continue current meds  transfer to [facility]

## 2023-01-18 NOTE — DISCHARGE NOTE NURSING/CASE MANAGEMENT/SOCIAL WORK - NSDCVIVACCINE_GEN_ALL_CORE_FT
Tdap; 02-Jun-2021 22:41; Peg Wallace (RN); [employer]; [id number] (Exp. Date: 18-Nov-2022); IntraMuscular; Deltoid Left.; 0.5 milliLiter(s); VIS (VIS Published: 09-May-2013, VIS Presented: 02-Jun-2021);

## 2023-01-24 DIAGNOSIS — G31.84 MILD COGNITIVE IMPAIRMENT OF UNCERTAIN OR UNKNOWN ETIOLOGY: ICD-10-CM

## 2023-01-24 DIAGNOSIS — M25.531 PAIN IN RIGHT WRIST: ICD-10-CM

## 2023-01-24 DIAGNOSIS — Z96.651 PRESENCE OF RIGHT ARTIFICIAL KNEE JOINT: ICD-10-CM

## 2023-01-24 DIAGNOSIS — E78.5 HYPERLIPIDEMIA, UNSPECIFIED: ICD-10-CM

## 2023-01-24 DIAGNOSIS — G40.909 EPILEPSY, UNSPECIFIED, NOT INTRACTABLE, WITHOUT STATUS EPILEPTICUS: ICD-10-CM

## 2023-01-24 DIAGNOSIS — F41.9 ANXIETY DISORDER, UNSPECIFIED: ICD-10-CM

## 2023-02-13 NOTE — CONSULT NOTE ADULT - CONSULT REASON
Please fax note and visit to [phone] and [phone] and also print so patient can  physical copy.
seizures

## 2023-08-11 NOTE — PROVIDER CONTACT NOTE (CHANGE IN STATUS NOTIFICATION) - NAME OF MD/NP/PA/DO NOTIFIED:
RRT called [FreeTextEntry2] : 1) Reduction in severity of symptoms demonstrated by 20% decline on self-report measures\par  \par  2) Demonstrate understanding of relationship between trauma history and thoughts and beliefs contributing to anxiety symptoms\par  \par  3) Engage in cognitive restructuring of beliefs and emotional processing and meaning-making around history of trauma.  [Trauma Focused CBT] : Trauma Focused CBT [de-identified] : Session focused on updating on past several week and treatment plan. Client and writer discussed previous weeks and experiences related to trust and power and control since the last session. 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 Validation and support were provided. Client appeared to gain insight in session. Session was shorten given her schedule and driving.    [Recommended Frequency of Visits: ____] : Recommended frequency of visits: [unfilled] [Return in ____ week(s)] : Return in [unfilled] week(s) [FreeTextEntry1] : Continue with treatment plan.

## 2024-02-15 NOTE — H&P ADULT - BIRTH SEX
Detail Level: Detailed
Add 1585x Cpt? (Do Not Bill If You Billed For The Procedure Placing The Sutures. This Is An Add-On Code That Must Be Billed With An E/M Visit Code): No
Female

## 2024-06-11 ENCOUNTER — APPOINTMENT (OUTPATIENT)
Dept: NEUROLOGY | Facility: CLINIC | Age: 84
End: 2024-06-11
Payer: MEDICARE

## 2024-06-11 VITALS — HEART RATE: 73 BPM | DIASTOLIC BLOOD PRESSURE: 67 MMHG | WEIGHT: 132 LBS | SYSTOLIC BLOOD PRESSURE: 111 MMHG

## 2024-06-11 DIAGNOSIS — E78.5 HYPERLIPIDEMIA, UNSPECIFIED: ICD-10-CM

## 2024-06-11 DIAGNOSIS — F03.90 UNSPECIFIED DEMENTIA W/OUT BEHAVIORAL DISTURBANCE: ICD-10-CM

## 2024-06-11 DIAGNOSIS — R26.89 OTHER ABNORMALITIES OF GAIT AND MOBILITY: ICD-10-CM

## 2024-06-11 DIAGNOSIS — F32.A DEPRESSION, UNSPECIFIED: ICD-10-CM

## 2024-06-11 DIAGNOSIS — G40.909 EPILEPSY, UNSPECIFIED, NOT INTRACTABLE, W/OUT STATUS EPILEPTICUS: ICD-10-CM

## 2024-06-11 PROCEDURE — 99205 OFFICE O/P NEW HI 60 MIN: CPT

## 2024-06-11 PROCEDURE — G2211 COMPLEX E/M VISIT ADD ON: CPT

## 2024-06-11 RX ORDER — DONEPEZIL HYDROCHLORIDE 10 MG/1
10 TABLET ORAL
Refills: 0 | Status: ACTIVE | COMMUNITY
Start: 2024-06-11

## 2024-06-11 RX ORDER — LEVETIRACETAM 500 MG/1
500 TABLET, FILM COATED ORAL TWICE DAILY
Refills: 0 | Status: ACTIVE | COMMUNITY
Start: 2024-06-11

## 2024-06-11 RX ORDER — ESCITALOPRAM OXALATE 10 MG/1
10 TABLET, FILM COATED ORAL
Refills: 0 | Status: ACTIVE | COMMUNITY
Start: 2024-06-11

## 2024-06-11 RX ORDER — EZETIMIBE 10 MG/1
10 TABLET ORAL
Refills: 0 | Status: ACTIVE | COMMUNITY
Start: 2024-06-11

## 2024-06-11 RX ORDER — LEVETIRACETAM 1000 MG/1
1000 TABLET, FILM COATED ORAL TWICE DAILY
Refills: 0 | Status: ACTIVE | COMMUNITY
Start: 2024-06-11

## 2024-06-11 RX ORDER — DIVALPROEX SODIUM 125 MG/1
125 CAPSULE, COATED PELLETS ORAL
Refills: 0 | Status: ACTIVE | COMMUNITY
Start: 2024-06-11

## 2024-06-11 NOTE — PHYSICAL EXAM
[General Appearance - Alert] : alert [Affect] : the affect was normal [Person] : oriented to person [Date / Time ___ / 5] : date / time [unfilled] / 5 [Place ___ / 5] : place [unfilled] / 5 [Registration ___ / 3] : registration [unfilled] / 3 [Serial Sevens ___/5] : serial sevens [unfilled] / 5 [Naming 2 Objects ___ / 2] : naming two objects [unfilled] / 2 [Repeating a Sentence ___ / 1] : repeating a sentence [unfilled] / 1 [3-stage Verbal Command ___ / 3] : three-stage verbal command [unfilled] / 3 [Written Command ___ / 1] : written command [unfilled] / 1 [Cranial Nerves Oculomotor (III)] : extraocular motion intact [Cranial Nerves Optic (II)] : visual acuity intact bilaterally,  visual fields full to confrontation, pupils equal round and reactive to light [Cranial Nerves Trigeminal (V)] : facial sensation intact symmetrically [Cranial Nerves Facial (VII)] : face symmetrical [Cranial Nerves Vestibulocochlear (VIII)] : hearing was intact bilaterally [Cranial Nerves Glossopharyngeal (IX)] : tongue and palate midline [Cranial Nerves Accessory (XI - Cranial And Spinal)] : head turning and shoulder shrug symmetric [Cranial Nerves Hypoglossal (XII)] : there was no tongue deviation with protrusion [Motor Handedness Right-Handed] : the patient is right hand dominant [Remote Intact] : remote memory intact [Registration Intact] : recent registration memory intact [Span Intact] : the attention span was normal [Concentration Intact] : normal concentrating ability [Repeating Phrases] : no difficulty repeating a phrase [Fluency] : fluency intact [Comprehension] : comprehension intact [Reading] : reading intact [Vocabulary] : adequate range of vocabulary [Total Score ___ / 30] : the patient achieved a score of [unfilled] /30 [Writing a Sentence ___ / 1] : write sentence [unfilled] / 1 [Copy a Design ___ / 1] : copy a design [unfilled] / 1 [Recall ___ / 3] : recall [unfilled] / 3 [Motor Tone] : muscle tone was normal in all four extremities [Motor Strength] : muscle strength was normal in all four extremities [Romberg's Sign] : a positive Romberg's sign was present [Abnormal Walk] : normal gait [Limited Balance] : the patient's balance was impaired [2+] : Brachioradialis left 2+ [1+] : Ankle jerk left 1+ [Sclera] : the sclera and conjunctiva were normal [PERRL With Normal Accommodation] : pupils were equal in size, round, reactive to light, with normal accommodation [Extraocular Movements] : extraocular movements were intact [Full Visual Field] : full visual field [Place] : disoriented to place [Time] : disoriented to time [Short Term Intact] : short term memory impaired [Visual Intact] : visual attention was ~T ~L decreased [Naming Objects] : difficulty naming common objects [Writing A Sentence] : difficulty writing a sentence [Current Events] : inadequate knowledge of current events [Past History] : inadequate knowledge of personal past history [Motor Strength Upper Extremities Bilaterally] : strength was normal in both upper extremities [Motor Strength Lower Extremities Bilaterally] : strength was normal in both lower extremities [Tremor] : no tremor present [FreeTextEntry4] : Mental Status Exam   Presidents: 1/5 Alternating Pattern: ok Spiral: ok Clock: 1/3 Repetition: ok Trail A: B:  Fluency: A: Animals:   Go-No-Go:  hesitancy  R/L discrimination on self and examiner: ok Cross-line commands: ok Praxis: ok - Motor: ok -Dynamic/Luria: ok -Ideomotor/Imitation: ok  -Ideational/writing/closing-in: ok -Dressing: ok

## 2024-06-11 NOTE — HISTORY OF PRESENT ILLNESS
[FreeTextEntry1] : NO COVID.   COVID VACCINE FULL.  HPI: 84-year-old female presents today for initial cognitive evaluation with her . Memory loss x 8-10 years and has progressed. She is forgetting more and is asking things many times. She has seizures. last seizure a year ago. She is very social and chatty amongst friends but not as much with her husbands. No behavioral changes. No hallcuinations or delusions. Foster child so unaware of Family history. Last week in half things have been worse with regard to memory.    PMH: Epilepsy, Depression, Anxiety    -Memory: STM loss   -Speech: ok  -Orientation: ok -Praxis: ok  -Decision making/Executive fx/Multitasking: poor    -Sleep: great  -Appetite: good   -Motor symptoms: unsteady gait (holds hand outside of house)     -B/B: a little    -Psychiatric symptoms: Anxiety and Depression- controlled     -Functional status:   [place] Index of [place]kinson in Activities of Daily Livin. Bathing/Showerin (prompting)   2. Dressin  3. Toiletin   4. Transferrin 5. Continence:  1 6: Feedin     TOTAL:  5     [place] Instrumental Activities of Daily Living:  A. Ability to Use Telephone: 1   B. Shoppin  C. Food Preparation: 0    D. Housekeepin   E. Laundry:  0 F. Transportation: 0    G. Responsibility for Own Medications: 0  H: Ability to Handle Finances: 0  TOTAL: 1 CDR: 1.5-2   -Professional status:  worked at [employer]     PCP and other physicians:  -PCP: Dr. Kimberlyn Matt  -Neuro: Dr. Deirdre Santiago Workup done: MRI

## 2024-06-11 NOTE — ASSESSMENT
[FreeTextEntry1] : Assessment: 84 year old female with pmh htn, hld. She needs prompting for some adls. Needs assistance with iadls. MMSE11/30. Poor visual spatial skills activity. Poor recall of words. Dysexecutive function.         Diagnostic Impression:     -moderate Dementia  -cognitive decline  Plan: -PT for poor balance -Continue Donepezil 10 mg  -Educated on lifestyle modifications such as daily exercise, healthy balanced diet, and good sleep habits

## 2024-06-11 NOTE — REVIEW OF SYSTEMS
[Confused or Disoriented] : confusion [Memory Lapses or Loss] : memory loss [Decr. Concentrating Ability] : decreased concentrating ability [Difficulty with Language] : ~M difficulty with language [Repeating Questions] : repeated questioning about recent events [Poor Coordination] : poor coordination [Difficulty Writing] : difficulty writing [Seizures] : convulsions [As Noted in HPI] : as noted in HPI [Limb Pain] : limb pain [Negative] : Heme/Lymph [Changed Thought Patterns] : no change in thought patterns [Facial Weakness] : no facial weakness [Arm Weakness] : no arm weakness [Hand Weakness] : no hand weakness [Leg Weakness] : no leg weakness [Difficulties in Speech] : no speech difficulties [Numbness] : no numbness [Tingling] : no tingling [Abnormal Sensation] : no abnormal sensation [Hypersensitivity] : no hypersensitivity [Dizziness] : no dizziness [Fainting] : no fainting [Lightheadedness] : no lightheadedness [Vertigo] : no vertigo [Cluster Headache] : no cluster headache [Migraine Headache] : no migraine headache [Tension Headache] : no tension-type headache [Difficulty Walking] : no difficulty walking [Inability to Walk] : able to walk [Ataxia] : no ataxia [Frequent Falls] : not falling [Limping] : not limping [FreeTextEntry9] : neck pain

## 2024-06-11 NOTE — REASON FOR VISIT
Refill request recieved via interface from pharmacy.    Last office visit 06/04/2019  pending appt  None Scheduled  Overdue for a CPE per Doctor's last Note.    Script set to  Fax  pending MD approval.      [Initial Evaluation] : an initial evaluation [Spouse] : spouse [FreeTextEntry1] : cognitive decline

## 2024-07-01 NOTE — PATIENT PROFILE ADULT - NSPROPTRIGHTSUPPORTPERSON_GEN_A_NUR
"Interval History: see "Hospital Course"    Review of Systems   Unable to perform ROS: Dementia     Objective:     Vital Signs (Most Recent):  Temp: 98.9 °F (37.2 °C) (07/01/24 0704)  Pulse: 61 (07/01/24 0704)  Resp: 18 (07/01/24 0704)  BP: (!) 165/72 (07/01/24 0704)  SpO2: 97 % (07/01/24 0704) Vital Signs (24h Range):  Temp:  [97.5 °F (36.4 °C)-98.9 °F (37.2 °C)] 98.9 °F (37.2 °C)  Pulse:  [60-76] 61  Resp:  [17-20] 18  SpO2:  [97 %-99 %] 97 %  BP: (133-180)/(71-81) 165/72     Weight: 70.8 kg (156 lb 1.6 oz)  Body mass index is 22.4 kg/m².    Intake/Output Summary (Last 24 hours) at 7/1/2024 0832  Last data filed at 7/1/2024 0830  Gross per 24 hour   Intake 720 ml   Output 650 ml   Net 70 ml         Physical Exam  Vitals and nursing note reviewed.   Constitutional:       General: He is not in acute distress.  HENT:      Head: Normocephalic and atraumatic.      Right Ear: External ear normal.      Left Ear: External ear normal.      Nose: Nose normal.      Mouth/Throat:      Mouth: Mucous membranes are moist.      Pharynx: Oropharynx is clear.   Eyes:      Extraocular Movements: Extraocular movements intact.      Conjunctiva/sclera: Conjunctivae normal.   Cardiovascular:      Rate and Rhythm: Normal rate and regular rhythm.      Pulses: Normal pulses.      Heart sounds: Normal heart sounds.   Pulmonary:      Effort: Pulmonary effort is normal.      Breath sounds: Normal breath sounds.   Abdominal:      General: Bowel sounds are normal. There is no distension.      Palpations: Abdomen is soft.      Tenderness: There is no abdominal tenderness.   Musculoskeletal:         General: Normal range of motion.      Cervical back: Normal range of motion and neck supple.   Skin:     General: Skin is warm and dry.   Neurological:      Mental Status: He is disoriented.             Significant Labs: All pertinent labs within the past 24 hours have been reviewed.    Significant Imaging: I have reviewed all pertinent imaging " results/findings within the past 24 hours.   same name as above

## 2024-07-18 DIAGNOSIS — R47.9 UNSPECIFIED SPEECH DISTURBANCES: ICD-10-CM

## 2024-10-11 ENCOUNTER — APPOINTMENT (OUTPATIENT)
Dept: NEUROLOGY | Facility: CLINIC | Age: 84
End: 2024-10-11

## 2025-05-05 ENCOUNTER — APPOINTMENT (OUTPATIENT)
Dept: NEUROLOGY | Facility: CLINIC | Age: 85
End: 2025-05-05
Payer: MEDICARE

## 2025-05-05 VITALS — SYSTOLIC BLOOD PRESSURE: 145 MMHG | WEIGHT: 130 LBS | HEART RATE: 68 BPM | DIASTOLIC BLOOD PRESSURE: 79 MMHG

## 2025-05-05 DIAGNOSIS — R26.81 UNSTEADINESS ON FEET: ICD-10-CM

## 2025-05-05 DIAGNOSIS — R52 PAIN, UNSPECIFIED: ICD-10-CM

## 2025-05-05 PROCEDURE — 99214 OFFICE O/P EST MOD 30 MIN: CPT

## 2025-05-05 RX ORDER — DIVALPROEX SODIUM 125 MG/1
125 TABLET, DELAYED RELEASE ORAL 3 TIMES DAILY
Refills: 0 | Status: ACTIVE | COMMUNITY
Start: 2025-05-05

## 2025-05-05 RX ORDER — MEMANTINE 10 MG/1
10 TABLET ORAL
Qty: 60 | Refills: 2 | Status: ACTIVE | COMMUNITY
Start: 2025-05-05

## 2025-05-05 RX ORDER — GABAPENTIN 300 MG/1
300 CAPSULE ORAL
Qty: 30 | Refills: 2 | Status: ACTIVE | COMMUNITY
Start: 2025-05-05

## 2025-06-08 NOTE — ED ADULT TRIAGE NOTE - ESI TRIAGE ACUITY LEVEL, MLM
[facility]  Discharge Summary - Medicine & Pediatrics       Date of Admission:  2025  Date of Discharge:  2025  Discharging Provider: Josselyn Thomas DO; Hilaria Elliott MD  Discharge Service: Hospitalist Service    Discharge Diagnoses   The primary encounter diagnosis was Yeast infection of the vagina. Diagnoses of  labor in third trimester without delivery and Insomnia, unspecified type were also pertinent to this visit.        Clinically Significant Risk Factors          Follow-ups Needed After Discharge   Patient instructed to follow-up at labor and delivery at approximately 1730 tonight, 2025 for second dose of betamethasone.    Patient instructed to follow-up as scheduled with her continuity OB provider.    Follow-up Appointments       Hospital Follow-up with Existing Primary Care Provider (PCP)          Schedule Primary Care visit within: 7 Days               Unresulted Labs Ordered in the Past 30 Days of this Admission       No orders found for last 31 day(s).            Discharge Disposition   Discharged to home  Condition at discharge: Stable    Hospital Course   Lucinda Ji was admitted on 2025 for evaluation of abdominal pain and  labor rule out.  The following problems were addressed during her hospitalization:     Labor  Patient is a 36 year old  at 33w2d gestation based on dating ultrasound who initially presented to maternity care for further evaluation of lower abdominal pain.  During evaluation, patient noted to have regular contractions while in triage.  Cervical exam showing patient 1.5 cm dilated.  Given regular contractions and her OB history, in-house OB was consulted and patient was treated for  labor with nifedipine protocol and given gestational age under 34 weeks, betamethasone was administered.  Patient was reevaluated, with no change in cervical exam.  On morning of 2025, patient had resolution of her  abdominal pain, contractions had subsided; patient endorsed good fetal movement, denied any loss of fluid or vaginal bleeding and was hopeful to go home.  In-house OB evaluated patient, and plan was for patient to discharge with plans to return for second dose of betamethasone.  Instructed patient to represent to labor and delivery at approximately 1730, 25 for second dose of betamethasone.  In addition, recommended completion of p.o. nifedipine to complete 48 hours.  Instructions provided for patient and included in after visit summary.    Abdominal pain  Patient initially presented due to worsening abdominal pain.  Our in-house OB team was consulted given past obstetrical history.  Patient with history of fibroids s/p myomectomy.  In-house OB discussed with patient that abdominal pain could be secondary to degenerating fibroids.  Patient is status post myomectomy, therefore, recommendation for primary  for delivery.  On morning of 2025, patient reported that her abdominal pain had resolved.  On my assessment, patient was sitting up in bed eating breakfast denying any pain.  Plan was to discharge with patient to come back for second dose of betamethasone.  See above for additional details on  labor.    Yeast infection  Upon workup, patient noted to have yeast infection.  Was initiated on clotrimazole, to be continued for 7-day total course.    Chronic hypertension  Patient with history of chronic hypertension, on p.o. labetalol.  During hospital stay, noted to have elevated blood pressure readings.  Labs obtained which showed CMP largely within normal limits, negative protein to creatinine ratio.  Patient was instructed to continue her oral antihypertensives as prescribed.  In addition, advised to continue nifedipine for  management.  See above.        The use of Dragon/Nutrinia dictation services was used to construct the content of this note; any grammatical errors are  non-intentional. Please contact the author directly if you are in need of any clarification.      Consultations This Hospital Stay   OB GYN IP CONSULT    Code Status   Full Code       The patient was discussed with MD Josselyn Marie, [facility] LABOR AND DELIVERY  [facility] [facility] [address]  Phone: [phone]  Fax: [phone]  ______________________________________________________________________    Physical Exam   Vital Signs: Temp: 97.8  F (36.6  C) Temp src: Oral BP: 139/75 Pulse: 117   Resp: 20 SpO2: 97 % O2 Device: None (Room air)    Weight: 0 lbs 0 oz  GENERAL: healthy, alert and no distress  RESP: speaking in full sentences, normal work of breathing   CV: extremities well perfused  PSYCH: mentation appears normal        Primary Care Physician   Joie Ghosh    Discharge Orders      Discharge Instructions    Please re present to Labor and Delivery for your second dose of Betamethasone around 1730 tonight.   Continue the clotrimazole for your yeast infection for 6 additional days.   Continue Nifedipine to help prevent any  labor for two additional doses. Please take first pill around 1400. Please take second pill around 2000. Take your blood pressure prior to taking the Nifedipine. If SBP <90, do not take medicine.     Reason for your hospital stay    You were admitted for concerns of  labor.     Activity    Your activity upon discharge: activity as tolerated     Diet    Follow this diet upon discharge: Current Diet:Orders Placed This Encounter      Room Service      Regular Diet Adult     Hospital Follow-up with Existing Primary Care Provider (PCP)            Significant Results and Procedures   Most Recent 3 CBC's:  Recent Labs   Lab Test 25  1809 25  1026 25  0609 25  1631   WBC 11.2* 9.1  --  9.5   HGB 11.7 12.0 11.2* 11.9   MCV 88 89 89  89 89    415 356 411     Most Recent 3 BMP's:  Recent  Labs   Lab Test 25  0709 25  1525 25  1026 25  2110   NA  --  137 135 137   POTASSIUM  --  4.1 4.3 3.5   CHLORIDE  --  106 105 105   CO2  --  20* 21* 22   BUN  --  4.5* 4.4* 5.3*   CR  --  0.62 0.57 0.57   ANIONGAP  --  11 9 10   KRISTEN  --  9.0 8.9 8.9   * 83 118* 100*     Most Recent 2 LFT's:  Recent Labs   Lab Test 25  1525 25  1026   AST 32 45   ALT 39 48   ALKPHOS 140 138   BILITOTAL 0.3 0.3       Discharge Medications   Current Discharge Medication List        START taking these medications    Details   clotrimazole (LOTRIMIN) 1 % vaginal cream Place 1 Applicatorful vaginally at bedtime for 6 days.  Qty: 1 g, Refills: 0    Associated Diagnoses: Yeast infection of the vagina      hydrOXYzine HCl (ATARAX) 50 MG tablet Take 1 tablet (50 mg) by mouth nightly as needed for other (adjuvant pain, insomnia).  Qty: 14 tablet, Refills: 0    Associated Diagnoses: Insomnia, unspecified type      NIFEdipine (PROCARDIA) 20 MG capsule Take 1 capsule (20 mg) by mouth every 6 hours for 2 doses.  Qty: 2 capsule, Refills: 0    Associated Diagnoses:  labor in third trimester without delivery           CONTINUE these medications which have NOT CHANGED    Details   acetaminophen (TYLENOL) 325 MG tablet Take 1-2 tablets (325-650 mg) by mouth every 6 hours as needed for mild pain.  Qty: 100 tablet, Refills: 2    Associated Diagnoses: Tension headache; High-risk pregnancy in third trimester      albuterol (PROAIR HFA/PROVENTIL HFA/VENTOLIN HFA) 108 (90 Base) MCG/ACT inhaler Inhale 1-2 puffs into the lungs every 4 hours as needed for shortness of breath or wheezing  Qty: 18 g, Refills: 11    Comments: Pharmacy may dispense brand covered by insurance (Proair, or proventil or ventolin or generic albuterol inhaler)  Associated Diagnoses: History of asthma      aspirin 81 MG EC tablet Take 1 tablet (81 mg) by mouth daily.  Qty: 90 tablet, Refills: 2    Associated Diagnoses: Supervision of  high-risk pregnancy, first trimester      doxylamine (UNISOM) 25 MG TABS tablet Take 0.5-1 tablets (12.5-25 mg) by mouth at bedtime.  Qty: 30 tablet, Refills: 1    Associated Diagnoses: Pregnancy test positive      labetalol (NORMODYNE) 100 MG tablet Take 1 tablet (100 mg) by mouth 2 times daily.  Qty: 120 tablet, Refills: 0    Associated Diagnoses: Chronic hypertension affecting pregnancy      metoclopramide (REGLAN) 5 MG tablet Take 1 tablet (5 mg) by mouth 3 times daily as needed for vomiting.  Qty: 30 tablet, Refills: 0    Associated Diagnoses: Nausea and vomiting in pregnancy      ondansetron (ZOFRAN) 4 MG tablet Take 1 tablet (4 mg) by mouth every 6 hours as needed for nausea or vomiting.  Qty: 30 tablet, Refills: 3    Associated Diagnoses: Nausea/vomiting in pregnancy      Prenatal Vit-Fe Fumarate-FA (PRENATAL MULTIVITAMIN  WITH IRON) 28-0.8 MG TABS Take 1 tablet by mouth daily.  Qty: 100 tablet, Refills: 3    Associated Diagnoses: Supervision of high-risk pregnancy, first trimester      Alcohol Swabs PADS 1 Units 4 times daily.  Qty: 120 each, Refills: 1    Associated Diagnoses: Elevated blood sugar level; High-risk pregnancy in third trimester      blood glucose (NO BRAND SPECIFIED) lancets standard Use to test blood sugar 4 times daily or as directed.  Qty: 120 each, Refills: 1    Associated Diagnoses: Elevated blood sugar level; High-risk pregnancy in third trimester      blood glucose (NO BRAND SPECIFIED) test strip Use to test blood sugar 4 times daily or as directed.  Qty: 120 strip, Refills: 1    Associated Diagnoses: Elevated blood sugar level; High-risk pregnancy in third trimester      blood glucose monitoring (NO BRAND SPECIFIED) meter device kit Use to test blood sugar 4 times daily or as directed.  Qty: 1 kit, Refills: 0    Associated Diagnoses: Elevated blood sugar level; High-risk pregnancy in third trimester      famotidine (PEPCID) 20 MG tablet Take 1 tablet (20 mg) by mouth 2 times  daily.  Qty: 90 tablet, Refills: 2    Associated Diagnoses: Supervision of high-risk pregnancy, first trimester      pyridOXINE (VITAMIN B6) 25 MG tablet Take 1 tablet (25 mg) by mouth 3 times daily.  Qty: 90 tablet, Refills: 1    Associated Diagnoses: Pregnancy test positive           Allergies   No Known Allergies   3